# Patient Record
Sex: FEMALE | Race: BLACK OR AFRICAN AMERICAN | NOT HISPANIC OR LATINO | URBAN - METROPOLITAN AREA
[De-identification: names, ages, dates, MRNs, and addresses within clinical notes are randomized per-mention and may not be internally consistent; named-entity substitution may affect disease eponyms.]

---

## 2023-11-11 ENCOUNTER — HOSPITAL ENCOUNTER (EMERGENCY)
Facility: HOSPITAL | Age: 48
Discharge: HOME | End: 2023-11-11
Attending: STUDENT IN AN ORGANIZED HEALTH CARE EDUCATION/TRAINING PROGRAM
Payer: COMMERCIAL

## 2023-11-11 VITALS
HEIGHT: 64 IN | HEART RATE: 80 BPM | WEIGHT: 148 LBS | BODY MASS INDEX: 25.27 KG/M2 | RESPIRATION RATE: 20 BRPM | OXYGEN SATURATION: 99 % | TEMPERATURE: 97.3 F | DIASTOLIC BLOOD PRESSURE: 71 MMHG | SYSTOLIC BLOOD PRESSURE: 123 MMHG

## 2023-11-11 DIAGNOSIS — M54.42 ACUTE LEFT-SIDED LOW BACK PAIN WITH LEFT-SIDED SCIATICA: ICD-10-CM

## 2023-11-11 PROCEDURE — 99285 EMERGENCY DEPT VISIT HI MDM: CPT | Mod: 25 | Performed by: STUDENT IN AN ORGANIZED HEALTH CARE EDUCATION/TRAINING PROGRAM

## 2023-11-11 PROCEDURE — 2500000001 HC RX 250 WO HCPCS SELF ADMINISTERED DRUGS (ALT 637 FOR MEDICARE OP): Performed by: STUDENT IN AN ORGANIZED HEALTH CARE EDUCATION/TRAINING PROGRAM

## 2023-11-11 PROCEDURE — 94760 N-INVAS EAR/PLS OXIMETRY 1: CPT

## 2023-11-11 PROCEDURE — 99283 EMERGENCY DEPT VISIT LOW MDM: CPT | Mod: 25

## 2023-11-11 PROCEDURE — 96372 THER/PROPH/DIAG INJ SC/IM: CPT

## 2023-11-11 PROCEDURE — 2500000004 HC RX 250 GENERAL PHARMACY W/ HCPCS (ALT 636 FOR OP/ED): Performed by: STUDENT IN AN ORGANIZED HEALTH CARE EDUCATION/TRAINING PROGRAM

## 2023-11-11 RX ORDER — METHOCARBAMOL 500 MG/1
500 TABLET, FILM COATED ORAL 4 TIMES DAILY
Qty: 12 TABLET | Refills: 0 | Status: SHIPPED | OUTPATIENT
Start: 2023-11-11 | End: 2024-02-22 | Stop reason: ALTCHOICE

## 2023-11-11 RX ORDER — KETOROLAC TROMETHAMINE 30 MG/ML
15 INJECTION, SOLUTION INTRAMUSCULAR; INTRAVENOUS ONCE
Status: COMPLETED | OUTPATIENT
Start: 2023-11-11 | End: 2023-11-11

## 2023-11-11 RX ORDER — METHOCARBAMOL 500 MG/1
1000 TABLET, FILM COATED ORAL EVERY 6 HOURS SCHEDULED
Status: DISCONTINUED | OUTPATIENT
Start: 2023-11-11 | End: 2023-11-11 | Stop reason: HOSPADM

## 2023-11-11 RX ADMIN — KETOROLAC TROMETHAMINE 15 MG: 30 INJECTION, SOLUTION INTRAMUSCULAR at 10:39

## 2023-11-11 RX ADMIN — METHOCARBAMOL 1000 MG: 500 TABLET ORAL at 10:40

## 2023-11-11 ASSESSMENT — PAIN DESCRIPTION - PAIN TYPE: TYPE: ACUTE PAIN

## 2023-11-11 ASSESSMENT — PAIN - FUNCTIONAL ASSESSMENT: PAIN_FUNCTIONAL_ASSESSMENT: WONG-BAKER FACES

## 2023-11-11 ASSESSMENT — PAIN SCALES - WONG BAKER: WONGBAKER_NUMERICALRESPONSE: NO HURT

## 2023-11-11 ASSESSMENT — COLUMBIA-SUICIDE SEVERITY RATING SCALE - C-SSRS
1. IN THE PAST MONTH, HAVE YOU WISHED YOU WERE DEAD OR WISHED YOU COULD GO TO SLEEP AND NOT WAKE UP?: NO
6. HAVE YOU EVER DONE ANYTHING, STARTED TO DO ANYTHING, OR PREPARED TO DO ANYTHING TO END YOUR LIFE?: NO
2. HAVE YOU ACTUALLY HAD ANY THOUGHTS OF KILLING YOURSELF?: NO

## 2023-11-11 ASSESSMENT — PAIN DESCRIPTION - LOCATION: LOCATION: BACK

## 2023-11-11 ASSESSMENT — PAIN DESCRIPTION - DIRECTION: RADIATING_TOWARDS: FOOT

## 2023-11-11 ASSESSMENT — PAIN DESCRIPTION - FREQUENCY: FREQUENCY: CONSTANT/CONTINUOUS

## 2023-11-11 ASSESSMENT — PAIN DESCRIPTION - ORIENTATION: ORIENTATION: RIGHT

## 2023-11-11 NOTE — ED PROVIDER NOTES
HPI   Chief Complaint   Patient presents with    Back Pain     Seen at local er few days ago for same, discharged. Had numbness to right foot at that time. Seeking new primary doctor. States can't walk but was in shower and coughed and felt a pop in right back now pain is worse       Presents with low back pain.  States she has had low back pain for approximately 6 months but it worsened over the past several days.  States that she initially was seen in the ER at [facility], where she was given 2 injections in the arm and ultimately discharged with steroids, muscle relaxers, Tylenol, and lidocaine patches.  She presents today for acute worsening of pain after she coughed while in the shower.  She describes the pain in her left low back that goes down her left leg.  She notes a numbness/tingling sensation from her knee down to her foot.  She denies fevers, chills, nausea, vomiting, saddle paresthesia, bowel/bladder incontinence, focal weakness, recent falls, recent heavy lifting, and history of IV drug use.  No history of malignancy, night sweats, or unexplained weight loss/weight gain.  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      History provided by:  Patient   used: No                        No data recorded                Patient History   No past medical history on file.  No past surgical history on file.  No family history on file.  Social History     Tobacco Use    Smoking status: Not on file    Smokeless tobacco: Not on file   Substance Use Topics    Alcohol use: Not on file    Drug use: Not on file       Physical Exam   ED Triage Vitals [11/11/23 1006]   Temp Heart Rate Resp BP   36.3 °C (97.3 °F) 55 18 104/56      SpO2 Temp Source Heart Rate Source Patient Position   98 % Temporal -- Sitting      BP Location FiO2 (%)     Right arm --       Physical Exam  Vitals and nursing note reviewed.   HENT:      Head: Atraumatic.      Mouth/Throat:      Mouth: Mucous  membranes are moist.   Eyes:      Conjunctiva/sclera: Conjunctivae normal.   Cardiovascular:      Rate and Rhythm: Normal rate and regular rhythm.   Pulmonary:      Effort: Pulmonary effort is normal.   Abdominal:      Palpations: Abdomen is soft.      Tenderness: There is no abdominal tenderness.   Musculoskeletal:         General: No deformity.      Cervical back: Normal range of motion.      Comments: There is no spinal tenderness to palpation.  There is lower left lumbar paravertebral tenderness to palpation.  The patient does have tenderness to palpation over the left SI joint.  Left lower extremity: No obvious deformities.  No bony tenderness palpation.  Positive KERRI test.  5/5 strength with hip flexion/extension, knee flexion/extension, ankle dorsiflexion/plantarflexion, and great toe dorsiflexion/plantarflexion.  Mild decrease in sensation from the knee to the foot as compared to the right lower extremity.  No clonus.  No hyperreflexia at the patellar or the Achilles.  Downgoing toes with Babinski.  Right lower extremity: No obvious deformities.  No bony tenderness palpation.5/5 strength with hip flexion/extension, knee flexion/extension, ankle dorsiflexion/plantarflexion, and great toe dorsiflexion/plantarflexion.  Sensation is intact to light touch in all dermatomal distributions.  No clonus.  No hyperreflexia at the patellar or the Achilles.  Downgoing toes with Babinski.   Skin:     General: Skin is warm and dry.   Neurological:      Mental Status: She is alert.      Comments: Moving all extremities         ED Course & MDM   ED Course as of 11/11/23 1218   Sat Nov 11, 2023   1154 Able to ambulate with assistance.  Patient states that difficulty ambulating secondary to pain and not weakness. [AB]      ED Course User Index  [AB] Jose Cano MD         Diagnoses as of 11/11/23 1218   Acute left-sided low back pain with left-sided sciatica       Medical Decision Making  No red flag signs/symptoms to  suggest cord compression, cauda equina, or pathologic fracture.  I do not think that she needs emergent MRI or CT imaging at this time.  Given her tenderness and findings on exam, more so suspect SI joint inflammation, IT band dysfunction and/or low back strain.  As for her decreased sensation from the knee down, we considered a peroneal neuropathy, patient does not have foot drop on exam and it does not follow a particular dermatomal distribution; she can follow-up as an outpatient for this.  Patient does state that she has an appoint with orthopedic surgery scheduled on the 16th of this month.  Reviewed how narcotics are not indicated for low back pain are likely to result in side effects as opposed to any clinical benefit.  We did discuss trialing Robaxin and will have her discontinue her current Norflex prescription in favor of Robaxin prescription, if this works.  We will also give IM Toradol.  We also discussed how time would help these injuries recover in addition to seeing orthopedic surgery for possible physical therapy.        Procedure  Procedures     Jose Cano MD  11/11/23 8175

## 2023-11-11 NOTE — DISCHARGE INSTRUCTIONS
Please follow-up with the orthopedic surgeon, as scheduled.  Please return to the emergency department for any of the signs/symptoms that we discussed, including loss of bowel/bladder control, saddle paresthesia, and weakness.  Please stop taking your Norflex and instead start the Robaxin prescription as needed for your pain.

## 2023-11-12 ENCOUNTER — HOSPITAL ENCOUNTER (EMERGENCY)
Facility: HOSPITAL | Age: 48
Discharge: HOME | End: 2023-11-13
Attending: STUDENT IN AN ORGANIZED HEALTH CARE EDUCATION/TRAINING PROGRAM
Payer: COMMERCIAL

## 2023-11-12 DIAGNOSIS — M51.27 LUMBOSACRAL DISC HERNIATION: ICD-10-CM

## 2023-11-12 DIAGNOSIS — M54.41 ACUTE LOW BACK PAIN WITH RIGHT-SIDED SCIATICA, UNSPECIFIED BACK PAIN LATERALITY: Primary | ICD-10-CM

## 2023-11-12 PROCEDURE — 85025 COMPLETE CBC W/AUTO DIFF WBC: CPT | Performed by: STUDENT IN AN ORGANIZED HEALTH CARE EDUCATION/TRAINING PROGRAM

## 2023-11-12 PROCEDURE — 36415 COLL VENOUS BLD VENIPUNCTURE: CPT | Performed by: STUDENT IN AN ORGANIZED HEALTH CARE EDUCATION/TRAINING PROGRAM

## 2023-11-12 PROCEDURE — 99284 EMERGENCY DEPT VISIT MOD MDM: CPT | Mod: 25

## 2023-11-12 PROCEDURE — 96374 THER/PROPH/DIAG INJ IV PUSH: CPT

## 2023-11-12 PROCEDURE — 81025 URINE PREGNANCY TEST: CPT

## 2023-11-12 PROCEDURE — 96372 THER/PROPH/DIAG INJ SC/IM: CPT | Mod: 59

## 2023-11-12 PROCEDURE — 80048 BASIC METABOLIC PNL TOTAL CA: CPT | Performed by: STUDENT IN AN ORGANIZED HEALTH CARE EDUCATION/TRAINING PROGRAM

## 2023-11-12 RX ORDER — ACETAMINOPHEN 325 MG/1
650 TABLET ORAL ONCE
Status: COMPLETED | OUTPATIENT
Start: 2023-11-12 | End: 2023-11-13

## 2023-11-12 RX ORDER — ORPHENADRINE CITRATE 30 MG/ML
60 INJECTION INTRAMUSCULAR; INTRAVENOUS ONCE
Status: COMPLETED | OUTPATIENT
Start: 2023-11-12 | End: 2023-11-13

## 2023-11-12 RX ORDER — LIDOCAINE 560 MG/1
1 PATCH PERCUTANEOUS; TOPICAL; TRANSDERMAL ONCE
Status: DISCONTINUED | OUTPATIENT
Start: 2023-11-12 | End: 2023-11-13 | Stop reason: HOSPADM

## 2023-11-13 ENCOUNTER — APPOINTMENT (OUTPATIENT)
Dept: RADIOLOGY | Facility: HOSPITAL | Age: 48
End: 2023-11-13
Payer: COMMERCIAL

## 2023-11-13 VITALS
TEMPERATURE: 98.2 F | WEIGHT: 148 LBS | HEIGHT: 64 IN | SYSTOLIC BLOOD PRESSURE: 125 MMHG | OXYGEN SATURATION: 98 % | HEART RATE: 74 BPM | BODY MASS INDEX: 25.27 KG/M2 | RESPIRATION RATE: 18 BRPM | DIASTOLIC BLOOD PRESSURE: 77 MMHG

## 2023-11-13 LAB
ANION GAP SERPL CALC-SCNC: 11 MMOL/L (ref 10–20)
APPEARANCE UR: CLEAR
BASOPHILS # BLD AUTO: 0.03 X10*3/UL (ref 0–0.1)
BASOPHILS NFR BLD AUTO: 0.3 %
BILIRUB UR STRIP.AUTO-MCNC: NEGATIVE MG/DL
BUN SERPL-MCNC: 10 MG/DL (ref 6–23)
CALCIUM SERPL-MCNC: 9.9 MG/DL (ref 8.6–10.3)
CHLORIDE SERPL-SCNC: 105 MMOL/L (ref 98–107)
CO2 SERPL-SCNC: 27 MMOL/L (ref 21–32)
COLOR UR: NORMAL
CREAT SERPL-MCNC: 0.77 MG/DL (ref 0.5–1.05)
EOSINOPHIL # BLD AUTO: 0.09 X10*3/UL (ref 0–0.7)
EOSINOPHIL NFR BLD AUTO: 0.8 %
ERYTHROCYTE [DISTWIDTH] IN BLOOD BY AUTOMATED COUNT: 13.4 % (ref 11.5–14.5)
GFR SERPL CREATININE-BSD FRML MDRD: >90 ML/MIN/1.73M*2
GLUCOSE SERPL-MCNC: 102 MG/DL (ref 74–99)
GLUCOSE UR STRIP.AUTO-MCNC: NEGATIVE MG/DL
HCT VFR BLD AUTO: 37.8 % (ref 36–46)
HGB BLD-MCNC: 12.2 G/DL (ref 12–16)
IMM GRANULOCYTES # BLD AUTO: 0.07 X10*3/UL (ref 0–0.7)
IMM GRANULOCYTES NFR BLD AUTO: 0.6 % (ref 0–0.9)
KETONES UR STRIP.AUTO-MCNC: NEGATIVE MG/DL
LEUKOCYTE ESTERASE UR QL STRIP.AUTO: NEGATIVE
LYMPHOCYTES # BLD AUTO: 2.01 X10*3/UL (ref 1.2–4.8)
LYMPHOCYTES NFR BLD AUTO: 18.4 %
MCH RBC QN AUTO: 28.1 PG (ref 26–34)
MCHC RBC AUTO-ENTMCNC: 32.3 G/DL (ref 32–36)
MCV RBC AUTO: 87 FL (ref 80–100)
MONOCYTES # BLD AUTO: 0.65 X10*3/UL (ref 0.1–1)
MONOCYTES NFR BLD AUTO: 6 %
NEUTROPHILS # BLD AUTO: 8.07 X10*3/UL (ref 1.2–7.7)
NEUTROPHILS NFR BLD AUTO: 73.9 %
NITRITE UR QL STRIP.AUTO: NEGATIVE
NRBC BLD-RTO: 0 /100 WBCS (ref 0–0)
PH UR STRIP.AUTO: 7 [PH]
PLATELET # BLD AUTO: 303 X10*3/UL (ref 150–450)
POTASSIUM SERPL-SCNC: 4.3 MMOL/L (ref 3.5–5.3)
PREGNANCY TEST URINE, POC: NEGATIVE
PROT UR STRIP.AUTO-MCNC: NEGATIVE MG/DL
RBC # BLD AUTO: 4.34 X10*6/UL (ref 4–5.2)
RBC # UR STRIP.AUTO: NEGATIVE /UL
SODIUM SERPL-SCNC: 139 MMOL/L (ref 136–145)
SP GR UR STRIP.AUTO: 1.01
UROBILINOGEN UR STRIP.AUTO-MCNC: <2 MG/DL
WBC # BLD AUTO: 10.9 X10*3/UL (ref 4.4–11.3)

## 2023-11-13 PROCEDURE — 72148 MRI LUMBAR SPINE W/O DYE: CPT

## 2023-11-13 PROCEDURE — 2500000004 HC RX 250 GENERAL PHARMACY W/ HCPCS (ALT 636 FOR OP/ED): Performed by: STUDENT IN AN ORGANIZED HEALTH CARE EDUCATION/TRAINING PROGRAM

## 2023-11-13 PROCEDURE — 81003 URINALYSIS AUTO W/O SCOPE: CPT | Performed by: STUDENT IN AN ORGANIZED HEALTH CARE EDUCATION/TRAINING PROGRAM

## 2023-11-13 PROCEDURE — 2500000005 HC RX 250 GENERAL PHARMACY W/O HCPCS: Performed by: STUDENT IN AN ORGANIZED HEALTH CARE EDUCATION/TRAINING PROGRAM

## 2023-11-13 PROCEDURE — 72148 MRI LUMBAR SPINE W/O DYE: CPT | Performed by: RADIOLOGY

## 2023-11-13 RX ORDER — LIDOCAINE 50 MG/G
1 PATCH TOPICAL DAILY
Qty: 7 PATCH | Refills: 0 | Status: SHIPPED | OUTPATIENT
Start: 2023-11-13 | End: 2023-11-20

## 2023-11-13 RX ORDER — KETOROLAC TROMETHAMINE 30 MG/ML
15 INJECTION, SOLUTION INTRAMUSCULAR; INTRAVENOUS ONCE
Status: COMPLETED | OUTPATIENT
Start: 2023-11-13 | End: 2023-11-13

## 2023-11-13 RX ADMIN — LIDOCAINE 1 PATCH: 4 PATCH TOPICAL at 00:04

## 2023-11-13 RX ADMIN — KETOROLAC TROMETHAMINE 15 MG: 30 INJECTION, SOLUTION INTRAMUSCULAR; INTRAVENOUS at 01:36

## 2023-11-13 RX ADMIN — ORPHENADRINE CITRATE 60 MG: 60 INJECTION INTRAMUSCULAR; INTRAVENOUS at 00:05

## 2023-11-13 RX ADMIN — ACETAMINOPHEN 650 MG: 325 TABLET ORAL at 00:05

## 2023-11-13 ASSESSMENT — LIFESTYLE VARIABLES
HAVE PEOPLE ANNOYED YOU BY CRITICIZING YOUR DRINKING: NO
EVER HAD A DRINK FIRST THING IN THE MORNING TO STEADY YOUR NERVES TO GET RID OF A HANGOVER: NO
HAVE YOU EVER FELT YOU SHOULD CUT DOWN ON YOUR DRINKING: NO
EVER FELT BAD OR GUILTY ABOUT YOUR DRINKING: NO
REASON UNABLE TO ASSESS: NO

## 2023-11-13 ASSESSMENT — PAIN - FUNCTIONAL ASSESSMENT: PAIN_FUNCTIONAL_ASSESSMENT: 0-10

## 2023-11-13 ASSESSMENT — PAIN DESCRIPTION - LOCATION: LOCATION: BACK

## 2023-11-13 ASSESSMENT — PAIN DESCRIPTION - ORIENTATION: ORIENTATION: RIGHT

## 2023-11-13 ASSESSMENT — PAIN SCALES - GENERAL: PAINLEVEL_OUTOF10: 4

## 2023-11-13 NOTE — ED PROVIDER NOTES
HPI   Chief Complaint   Patient presents with    Back Pain     Pt has right sided lower back pain that goes to right hip to foot. States that its hard to walk       48-year-old female past with history of diverticulosis presenting to the emergency department for symptoms of lower back pain.  Patient was seen in the ER few days prior and was diagnosed with sciatica and discharged with Robaxin.  She states she is to the point where she is having weakness in her right lower extremity and is having difficulty with ambulation.  Pain originates from the lower back and radiates downwards.  She denies any saddle anesthesia or urinary/stool incontinence or any other muscle aches and pains.  Patient denies any chest pain or shortness of breath.  She has no dysuria hematuria or any other muscle aches and pains to report at this time.                          No data recorded                Patient History   No past medical history on file.  No past surgical history on file.  No family history on file.  Social History     Tobacco Use    Smoking status: Not on file    Smokeless tobacco: Not on file   Substance Use Topics    Alcohol use: Not on file    Drug use: Not on file       Physical Exam   ED Triage Vitals [11/12/23 2324]   Temp Heart Rate Resp BP   36.1 °C (97 °F) 74 18 116/60      SpO2 Temp src Heart Rate Source Patient Position   -- -- -- --      BP Location FiO2 (%)     -- --       Physical Exam  Vitals reviewed.   Constitutional:       Appearance: Normal appearance.   HENT:      Head: Normocephalic and atraumatic.      Nose: Nose normal.      Mouth/Throat:      Mouth: Mucous membranes are moist.   Eyes:      Extraocular Movements: Extraocular movements intact.      Conjunctiva/sclera: Conjunctivae normal.   Cardiovascular:      Rate and Rhythm: Normal rate and regular rhythm.      Pulses: Normal pulses.      Heart sounds: Normal heart sounds.   Pulmonary:      Effort: Pulmonary effort is normal.      Breath sounds:  Normal breath sounds.   Abdominal:      General: Abdomen is flat. Bowel sounds are normal.      Palpations: Abdomen is soft.   Musculoskeletal:         General: Normal range of motion.      Cervical back: Normal. No bony tenderness.      Thoracic back: Normal. No bony tenderness.      Lumbar back: Normal. No bony tenderness.      Comments: Paraspinal tenderness in the lumbar spine.  No midline tenderness.  Range of motion intact in upper and lower extremities however 4/5 strength testing in right lower extremity range of motion.   Skin:     General: Skin is warm and dry.   Neurological:      Mental Status: She is alert and oriented to person, place, and time. Mental status is at baseline.      Cranial Nerves: Cranial nerves 2-12 are intact.      Sensory: Sensation is intact.      Motor: Motor function is intact.   Psychiatric:         Mood and Affect: Mood normal.         ED Course & MDM   ED Course as of 11/13/23 0317   Sun Nov 12, 2023   2335 48-year-old past medical history of diverticulosis presents emergency department for symptoms of lower back pain.  On examination vital signs are stable patient is well-appearing no Signy distress 2+ peripheral pulses.  She has 4/5 strength testing in right hip knee and ankle range of motion 2+ deep tendon reflexes.  2+ peripheral pulses.  Light touch sensation grossly intact in all 4 extremities.  Paraspinal tenderness no midline tenderness of spine.  Differential diagnosis includes spinal cord impingement patient has been ordered an MRI to look for spinal cord impingement at this time.  Patient will also have CBC CMP urine pregnancy test Tylenol and lidocaine patch ordered at this time patient will routinely reevaluate. [ZS]   Mon Nov 13, 2023   0041 Patient was also ordered Norflex.  Urinalysis showed no acute findings CBC CMP unremarkable hCG negative.  I ordered Toradol. [ZS]   0242 MRIs showed disc herniation at L4-L5 with no canal stenosis with compression of the L5  nerve root. [ZS]   0316 Is feeling better.  She is agreeable with discharge plan.  She will be given lidocaine patches.  Return precautions were discussed.  She will be given pain medicine follow-up and patient already has orthopedic spinal surgery follow-up. [ZS]      ED Course User Index  [ZS] Betzy Cruz MD         Diagnoses as of 11/13/23 0317   Acute low back pain with right-sided sciatica, unspecified back pain laterality   Lumbosacral disc herniation       Medical Decision Making      Procedure  Procedures     Betzy Cruz MD  11/13/23 0317

## 2023-11-13 NOTE — DISCHARGE INSTRUCTIONS
If you have any worsening back pain numbness weakness tingling arms or legs inability to control your bladder or bowels or any new/worsening/concerning symptoms return to the emergency department emergently by calling 911.  Read the provided information packet.

## 2023-12-05 ENCOUNTER — APPOINTMENT (OUTPATIENT)
Dept: NEUROSURGERY | Facility: CLINIC | Age: 48
End: 2023-12-05
Payer: COMMERCIAL

## 2023-12-11 ENCOUNTER — DOCUMENTATION (OUTPATIENT)
Dept: NEUROSURGERY | Facility: CLINIC | Age: 48
End: 2023-12-11
Payer: COMMERCIAL

## 2023-12-12 ENCOUNTER — OFFICE VISIT (OUTPATIENT)
Dept: NEUROSURGERY | Facility: CLINIC | Age: 48
End: 2023-12-12
Payer: COMMERCIAL

## 2023-12-12 ENCOUNTER — APPOINTMENT (OUTPATIENT)
Dept: NEUROSURGERY | Facility: CLINIC | Age: 48
End: 2023-12-12
Payer: COMMERCIAL

## 2023-12-12 VITALS
BODY MASS INDEX: 25.61 KG/M2 | HEIGHT: 64 IN | RESPIRATION RATE: 16 BRPM | DIASTOLIC BLOOD PRESSURE: 76 MMHG | SYSTOLIC BLOOD PRESSURE: 110 MMHG | HEART RATE: 105 BPM | TEMPERATURE: 97.5 F | WEIGHT: 150 LBS

## 2023-12-12 DIAGNOSIS — M51.36 BULGE OF LUMBAR DISC WITHOUT MYELOPATHY: ICD-10-CM

## 2023-12-12 DIAGNOSIS — M51.16 LUMBAR DISC HERNIATION WITH RADICULOPATHY: Primary | ICD-10-CM

## 2023-12-12 PROCEDURE — 99204 OFFICE O/P NEW MOD 45 MIN: CPT | Performed by: NEUROLOGICAL SURGERY

## 2023-12-12 PROCEDURE — 99214 OFFICE O/P EST MOD 30 MIN: CPT | Performed by: NEUROLOGICAL SURGERY

## 2023-12-12 RX ORDER — OXYCODONE HYDROCHLORIDE 5 MG/1
5 CAPSULE ORAL EVERY 4 HOURS PRN
COMMUNITY
End: 2023-12-18 | Stop reason: ALTCHOICE

## 2023-12-12 RX ORDER — GABAPENTIN 400 MG/1
400 CAPSULE ORAL 3 TIMES DAILY
COMMUNITY
End: 2023-12-18 | Stop reason: ALTCHOICE

## 2023-12-12 RX ORDER — DULOXETIN HYDROCHLORIDE 30 MG/1
30 CAPSULE, DELAYED RELEASE ORAL DAILY
COMMUNITY
End: 2023-12-18 | Stop reason: ALTCHOICE

## 2023-12-12 ASSESSMENT — PAIN SCALES - GENERAL: PAINLEVEL: 4

## 2023-12-12 NOTE — PROGRESS NOTES
It was a pleasure to see Ms. Bowman at the [facility] at [facility].     She is a really nice 48 y.o. female  who presents to us with complaints primarily axial LOWER BACK pain  that started about  7 months ago, and have been gradually worsening since that time.  The symptoms started after no known injury    The pain is 4 /10. The pain is described as aching and occurs all day.  Symptoms are exacerbated by walking for more than a few minutes. Factors which relieve the pain include narcotic pain medications      Numbness and/or tingling - YES    Weakness : No except some pain limited weakness.    Bladder/Bowel symptoms - NO    The patient has tried medications (eg: gabapentin, NSAIDS and narcotics ) : Yes  PT : No  Pain Management with ESIs/selective nerve blocks  - NO    she is a SMOKER and NON-DIABETIC    History of Depression : YES    PRIOR SPINE SURGERY: NO    Denies use of Aspirin, Coumadin, or Plavix or any other anticoagulants     NARCOTICS for pain : YES    Part of this patient’s history is from personal review of the patient’s previous charts.      No past medical history on file.      Current Outpatient Medications:     methocarbamol (Robaxin) 500 mg tablet, Take 1 tablet (500 mg) by mouth 4 times a day for 3 days. As needed for pain, Disp: 12 tablet, Rfl: 0      Review of Systems :   Constitutional: No fever or chills  Respiratory: No shortness of breath or wheezing  Musculoskeletal: see HPI above   Neurologic: See HPI above        EXAM:   There were no vitals filed for this visit.  NEURO: Neurologically patient is awake alert and oriented X 3    No obvious cranial nerve deficit.  Strength is almost 5/5 throughout all motor groups tested with no asymmetric significant motor deficit.  Deep tendon reflexes are symmetric throughout.   Gait : Antalgic.  SLR positive at 30 degrees.  Sensory examination is intact to touch and painful stimuli.      IMAGING:   MRI of the lumbar spine done  on 11/13/2023 was reviewed personally and shows evidence of a right L4-5 disc herniation with lateral recess stenosis.    ASSESSMENT AND PLAN:  Ms. Bowman has not tried any conservative treatment. Her neurological examination is completely benign with absence of any focal neurological deficits.  Given the absence of any focal neurological deficit or any bowel bladder symptoms there is no urgent indication to consider any surgical intervention. I have given her a referral for  PAIN management with Dr. Mccallum.  She already has a referral for physical therapy and I encouraged her to do that.  I will be happy to see her back if she continues to be symptomatic despite all conservative treatment to discuss surgical options in details as I do believe the patient has a pathology that is amenable to surgery with good overall long term outcome.  I discussed with her that if she develops any symptoms of focal motor weakness or bowel or symptoms she should go to an ED in which case she may require emergency surgery.          Femi Tucker MD, [facility]   of Neurological Surgery  [employer] [employer] [facility] [employer]  Attending Surgeon  Director - Minimally Invasive Spine Surgery  [place], [place]      Some of this note was completed using Dragon voice recognition technology and sometimes the software misinterprets words. This may include unintended errors with respect to translation of words, typographical errors or grammar errors which may not have been identified prior to finalization of the chart note. Please take this into account when reading this note

## 2023-12-14 PROBLEM — F10.10 ALCOHOL ABUSE: Status: ACTIVE | Noted: 2023-12-14

## 2023-12-14 PROBLEM — F17.200 NICOTINE USE DISORDER: Status: ACTIVE | Noted: 2023-11-23

## 2023-12-14 PROBLEM — B97.7 HUMAN PAPILLOMA VIRUS INFECTION: Status: ACTIVE | Noted: 2023-12-14

## 2023-12-14 PROBLEM — M53.9 SCIATICA ASSOCIATED WITH DISORDER OF MULTIPLE SITES OF SPINE: Status: ACTIVE | Noted: 2023-11-19

## 2023-12-14 PROBLEM — F41.9 ANXIETY: Status: ACTIVE | Noted: 2019-02-28

## 2023-12-14 PROBLEM — F43.10 PTSD (POST-TRAUMATIC STRESS DISORDER): Status: ACTIVE | Noted: 2019-02-28

## 2023-12-14 PROBLEM — M54.30 SCIATICA ASSOCIATED WITH DISORDER OF MULTIPLE SITES OF SPINE: Status: ACTIVE | Noted: 2023-11-19

## 2023-12-14 RX ORDER — GABAPENTIN 400 MG/1
400 CAPSULE ORAL 3 TIMES DAILY
COMMUNITY
Start: 2023-11-23 | End: 2024-02-22

## 2023-12-14 RX ORDER — KETOROLAC TROMETHAMINE 10 MG/1
TABLET, FILM COATED ORAL EVERY 6 HOURS
COMMUNITY
Start: 2023-12-05

## 2023-12-14 RX ORDER — DULOXETIN HYDROCHLORIDE 30 MG/1
1 CAPSULE, DELAYED RELEASE ORAL EVERY 24 HOURS
COMMUNITY
Start: 2023-12-05

## 2023-12-14 RX ORDER — NALOXONE HYDROCHLORIDE 4 MG/.1ML
SPRAY NASAL
COMMUNITY
Start: 2023-12-08 | End: 2024-02-22 | Stop reason: ALTCHOICE

## 2023-12-14 RX ORDER — OXYCODONE HYDROCHLORIDE 5 MG/1
TABLET ORAL EVERY 6 HOURS
COMMUNITY
Start: 2023-12-08 | End: 2024-02-22 | Stop reason: ALTCHOICE

## 2023-12-14 RX ORDER — GABAPENTIN 400 MG/1
1 CAPSULE ORAL EVERY 8 HOURS
COMMUNITY
End: 2023-12-18 | Stop reason: ALTCHOICE

## 2023-12-14 RX ORDER — LORATADINE 10 MG/1
10 TABLET ORAL
COMMUNITY
Start: 2016-12-08 | End: 2023-12-18 | Stop reason: ALTCHOICE

## 2023-12-14 RX ORDER — SERTRALINE HYDROCHLORIDE 100 MG/1
200 TABLET, FILM COATED ORAL
COMMUNITY
End: 2023-12-18 | Stop reason: ALTCHOICE

## 2023-12-14 RX ORDER — ACETAMINOPHEN 500 MG
1000 TABLET ORAL EVERY 8 HOURS PRN
COMMUNITY
Start: 2023-11-23

## 2023-12-18 ENCOUNTER — OFFICE VISIT (OUTPATIENT)
Dept: PAIN MEDICINE | Facility: CLINIC | Age: 48
End: 2023-12-18
Payer: COMMERCIAL

## 2023-12-18 VITALS
RESPIRATION RATE: 15 BRPM | WEIGHT: 150 LBS | BODY MASS INDEX: 24.99 KG/M2 | HEART RATE: 110 BPM | DIASTOLIC BLOOD PRESSURE: 69 MMHG | TEMPERATURE: 96.3 F | HEIGHT: 65 IN | SYSTOLIC BLOOD PRESSURE: 113 MMHG

## 2023-12-18 DIAGNOSIS — G89.29 CHRONIC RIGHT-SIDED LOW BACK PAIN WITH RIGHT-SIDED SCIATICA: ICD-10-CM

## 2023-12-18 DIAGNOSIS — M51.36 BULGE OF LUMBAR DISC WITHOUT MYELOPATHY: ICD-10-CM

## 2023-12-18 DIAGNOSIS — M54.41 CHRONIC RIGHT-SIDED LOW BACK PAIN WITH RIGHT-SIDED SCIATICA: ICD-10-CM

## 2023-12-18 DIAGNOSIS — M47.816 LUMBAR SPONDYLOSIS: ICD-10-CM

## 2023-12-18 DIAGNOSIS — M54.16 LUMBAR NEURITIS: Primary | ICD-10-CM

## 2023-12-18 DIAGNOSIS — M51.26 LUMBAR DISC HERNIATION: ICD-10-CM

## 2023-12-18 PROCEDURE — 99215 OFFICE O/P EST HI 40 MIN: CPT | Performed by: ANESTHESIOLOGY

## 2023-12-18 RX ORDER — GABAPENTIN 600 MG/1
600 TABLET ORAL 3 TIMES DAILY
Qty: 90 TABLET | Refills: 11 | Status: SHIPPED | OUTPATIENT
Start: 2023-12-18 | End: 2024-02-22

## 2023-12-18 ASSESSMENT — COLUMBIA-SUICIDE SEVERITY RATING SCALE - C-SSRS
2. HAVE YOU ACTUALLY HAD ANY THOUGHTS OF KILLING YOURSELF?: NO
6. HAVE YOU EVER DONE ANYTHING, STARTED TO DO ANYTHING, OR PREPARED TO DO ANYTHING TO END YOUR LIFE?: NO
1. IN THE PAST MONTH, HAVE YOU WISHED YOU WERE DEAD OR WISHED YOU COULD GO TO SLEEP AND NOT WAKE UP?: NO

## 2023-12-18 ASSESSMENT — LIFESTYLE VARIABLES
AUDIT-C TOTAL SCORE: 0
HOW OFTEN DO YOU HAVE A DRINK CONTAINING ALCOHOL: NEVER
HOW OFTEN DO YOU HAVE SIX OR MORE DRINKS ON ONE OCCASION: NEVER
HOW MANY STANDARD DRINKS CONTAINING ALCOHOL DO YOU HAVE ON A TYPICAL DAY: PATIENT DOES NOT DRINK
SKIP TO QUESTIONS 9-10: 1

## 2023-12-18 ASSESSMENT — PATIENT HEALTH QUESTIONNAIRE - PHQ9
SUM OF ALL RESPONSES TO PHQ9 QUESTIONS 1 AND 2: 0
2. FEELING DOWN, DEPRESSED OR HOPELESS: NOT AT ALL
1. LITTLE INTEREST OR PLEASURE IN DOING THINGS: NOT AT ALL

## 2023-12-18 ASSESSMENT — PAIN SCALES - GENERAL: PAINLEVEL: 3

## 2023-12-18 NOTE — PROGRESS NOTES
Right low back pain to the hip down the leg and into the foot.  History of hip pain.  Denies back and neck surgery.

## 2023-12-18 NOTE — PROGRESS NOTES
History Of Present Illness  Ilsa Bowman is a 48 y.o. female presenting with   Chief Complaint   Patient presents with    Back Pain       Patient presents with complaints of chronic low back pain to the RLE thru her tailbone and down her RLE with numbness in her first 2 great toes since November of 2023. She was hospitalized for several days at [facility] for severe pain and was unable to walk.  She was treated for pain and discharged .       The pain is constant, worse with standing and better with sitting. The pain is a locking and stabbing and shooting to the RLE. Denies LE saddle anesthesia, bowel or bladder incontinence. To manage this pain the patient has attempted Oxycodone via hospital discharge.     PAIN SCORE:  3-10 /10.    PCP: Dr. Gerardo Morrow MD ([facility])        Past Medical History  She has no past medical history on file.    Surgical History  She has a past surgical history that includes XR ankle (Left).     Social History  She reports that she has been smoking cigarettes. She has never used smokeless tobacco. She reports that she does not drink alcohol and does not use drugs.    Works as a  and does a lot of lifting     Family History  No family history on file.     Allergies  Patient has no known allergies.    Review of Systems    All other systems reviewed and negative for any deficits. Pertinent positives and negatives were considered in the medical decision making process.        Physical Exam  /69   Pulse 110   Temp 35.7 °C (96.3 °F)   Resp 15   Wt 68 kg (150 lb)     General: Pt appears stated age    Eyes: Conjunctiva non-icteric and lids without obvious rash or drooping. Pupils are symmetric    ENT: External ears and nose appear to be without deformity or rash. No lesions or masses noted. Hearing is grossly intact    Neck: No JVD noted, tracheal position midline. No goiter noted on assessment of thyroid    Respiratory: No gasping or shortness of breath  noted, no use of accessory muscles noted    Cardiovascular: Extremities show no edema or varicosities    Musculoskeletal: Gait is antalgic, unable to stand without assistance     Digits/nails show no clubbing or cyanosis    Exam of muscles/joints/bones shows no gross atrophy and no abnormal/involuntary movements in the head/neckNo asymmetry or masses noted in the head/neck    Stability: no subluxation noted on movement of bilateral upper extremities or head/neck    Strength: 3/5 in RLE and 5/5 LLE     Range of Motion: DEC DORSIFLEXION IN RLE     Sensation: dec to sharp touch in rle into the shin     Cranial nerves 2-12 are grossly intact    Psychiatric: Pt is alert and oriented to time, place and person.         Assessment/Plan   1. Lumbar neuritis  gabapentin (Neurontin) 600 mg tablet    Transforaminal    FL pain management TC      2. Bulge of lumbar disc without myelopathy  Referral to Pain Medicine      3. Lumbar disc herniation        4. Chronic right-sided low back pain with right-sided sciatica        5. Lumbar spondylosis             1. I have provided the patient with a list of physical therapy exercises to learn and perform to strengthen core, maintain stabilization, and reduce pain. We reviewed the exercises in detail and I encouraged them to perform them on a regular basis.    Pt is looking into getting home therapy via her PCP.     2. I would recommend the pt start on Gabapentin 600mg TID to help with nerve related pain. We discussed the risks, benefits, and side effects to this medication including the mechanism of action and the pt understands and agrees.    She was taking Gabapentin 400mg TID with no side effects there is mild to moderate central canal stenosis and severe right lateral recess stenosis.     3. I extensively reviewed the patients MRI findings in detail, including review of the actual images and provided a detailed explanation of the findings using a spine model.     There is noted a  [id number] disc herniation at the L4/5 level severe stenosis. There ar smaller disc herniations at the L3/4 and L5/S1 level.     4.  I advised the patient to quit tobacco as it worsens chronic pain, disc health, and can increase the risk of complications and poor healing with any procedure. Tobacco also causes a whole host of other deadly diseases. I informed the patient that the single most important thing they can do to benefit their health would be to quit tobacco.    5.  The patient is a candidate for a RIGHT LTR L5 AND S1 LTR to treat low back and radicular pain. I spent time with the patient discussing all of the risks, benefits, and alternatives to this measure. Including but not limited to spinal infection, epidural hematoma/abscess, paralysis, nerve injury, steroid effects, and spinal headache. The patient understands and agrees to proceed as needed. Pt understands that her injection may make her pain significantly worse given the degree of her stenosis.         Kofi Mccallum MD    I spent time with the patient reviewing their imaging and discussing the risks benefits and alternatives to the above plan. A total of 45 minutes was spent reviewing the data and greater than 50% of that time was with the patient during the face to face encounter discussing treatment options both surgical, non-surgical, and minimally invasive techniques.

## 2023-12-18 NOTE — H&P (VIEW-ONLY)
History Of Present Illness  Ilsa Bowman is a 48 y.o. female presenting with   Chief Complaint   Patient presents with    Back Pain       Patient presents with complaints of chronic low back pain to the RLE thru her tailbone and down her RLE with numbness in her first 2 great toes since November of 2023. She was hospitalized for several days at [facility] for severe pain and was unable to walk.  She was treated for pain and discharged .       The pain is constant, worse with standing and better with sitting. The pain is a locking and stabbing and shooting to the RLE. Denies LE saddle anesthesia, bowel or bladder incontinence. To manage this pain the patient has attempted Oxycodone via hospital discharge.     PAIN SCORE:  3-10 /10.    PCP: Dr. Gerardo Morrow MD ([facility])        Past Medical History  She has no past medical history on file.    Surgical History  She has a past surgical history that includes XR ankle (Left).     Social History  She reports that she has been smoking cigarettes. She has never used smokeless tobacco. She reports that she does not drink alcohol and does not use drugs.    Works as a  and does a lot of lifting     Family History  No family history on file.     Allergies  Patient has no known allergies.    Review of Systems    All other systems reviewed and negative for any deficits. Pertinent positives and negatives were considered in the medical decision making process.        Physical Exam  /69   Pulse 110   Temp 35.7 °C (96.3 °F)   Resp 15   Wt 68 kg (150 lb)     General: Pt appears stated age    Eyes: Conjunctiva non-icteric and lids without obvious rash or drooping. Pupils are symmetric    ENT: External ears and nose appear to be without deformity or rash. No lesions or masses noted. Hearing is grossly intact    Neck: No JVD noted, tracheal position midline. No goiter noted on assessment of thyroid    Respiratory: No gasping or shortness of breath  noted, no use of accessory muscles noted    Cardiovascular: Extremities show no edema or varicosities    Musculoskeletal: Gait is antalgic, unable to stand without assistance     Digits/nails show no clubbing or cyanosis    Exam of muscles/joints/bones shows no gross atrophy and no abnormal/involuntary movements in the head/neckNo asymmetry or masses noted in the head/neck    Stability: no subluxation noted on movement of bilateral upper extremities or head/neck    Strength: 3/5 in RLE and 5/5 LLE     Range of Motion: DEC DORSIFLEXION IN RLE     Sensation: dec to sharp touch in rle into the shin     Cranial nerves 2-12 are grossly intact    Psychiatric: Pt is alert and oriented to time, place and person.         Assessment/Plan   1. Lumbar neuritis  gabapentin (Neurontin) 600 mg tablet    Transforaminal    [place] pain management TC      2. Bulge of lumbar disc without myelopathy  Referral to Pain Medicine      3. Lumbar disc herniation        4. Chronic right-sided low back pain with right-sided sciatica        5. Lumbar spondylosis             1. I have provided the patient with a list of physical therapy exercises to learn and perform to strengthen core, maintain stabilization, and reduce pain. We reviewed the exercises in detail and I encouraged them to perform them on a regular basis.    Pt is looking into getting home therapy via her PCP.     2. I would recommend the pt start on Gabapentin 600mg TID to help with nerve related pain. We discussed the risks, benefits, and side effects to this medication including the mechanism of action and the pt understands and agrees.    She was taking Gabapentin 400mg TID with no side effects there is mild to moderate central canal stenosis and severe right lateral recess stenosis.     3. I extensively reviewed the patients MRI findings in detail, including review of the actual images and provided a detailed explanation of the findings using a spine model.     There is noted a  [id number] disc herniation at the L4/5 level severe stenosis. There ar smaller disc herniations at the L3/4 and L5/S1 level.     4.  I advised the patient to quit tobacco as it worsens chronic pain, disc health, and can increase the risk of complications and poor healing with any procedure. Tobacco also causes a whole host of other deadly diseases. I informed the patient that the single most important thing they can do to benefit their health would be to quit tobacco.    5.  The patient is a candidate for a RIGHT LTR L5 AND S1 LTR to treat low back and radicular pain. I spent time with the patient discussing all of the risks, benefits, and alternatives to this measure. Including but not limited to spinal infection, epidural hematoma/abscess, paralysis, nerve injury, steroid effects, and spinal headache. The patient understands and agrees to proceed as needed. Pt understands that her injection may make her pain significantly worse given the degree of her stenosis.         Kofi Mccallum MD    I spent time with the patient reviewing their imaging and discussing the risks benefits and alternatives to the above plan. A total of 45 minutes was spent reviewing the data and greater than 50% of that time was with the patient during the face to face encounter discussing treatment options both surgical, non-surgical, and minimally invasive techniques.

## 2023-12-22 ENCOUNTER — APPOINTMENT (OUTPATIENT)
Dept: PAIN MEDICINE | Facility: CLINIC | Age: 48
End: 2023-12-22
Payer: COMMERCIAL

## 2023-12-22 ENCOUNTER — HOSPITAL ENCOUNTER (OUTPATIENT)
Dept: PAIN MEDICINE | Facility: CLINIC | Age: 48
Discharge: HOME | End: 2023-12-22
Payer: COMMERCIAL

## 2023-12-22 ENCOUNTER — ANCILLARY PROCEDURE (OUTPATIENT)
Dept: RADIOLOGY | Facility: CLINIC | Age: 48
End: 2023-12-22
Payer: COMMERCIAL

## 2023-12-22 ENCOUNTER — APPOINTMENT (OUTPATIENT)
Dept: RADIOLOGY | Facility: CLINIC | Age: 48
End: 2023-12-22
Payer: COMMERCIAL

## 2023-12-22 VITALS
SYSTOLIC BLOOD PRESSURE: 122 MMHG | OXYGEN SATURATION: 98 % | TEMPERATURE: 97.5 F | RESPIRATION RATE: 15 BRPM | HEART RATE: 110 BPM | DIASTOLIC BLOOD PRESSURE: 74 MMHG

## 2023-12-22 DIAGNOSIS — M54.16 LUMBAR NEURITIS: ICD-10-CM

## 2023-12-22 PROCEDURE — 2500000004 HC RX 250 GENERAL PHARMACY W/ HCPCS (ALT 636 FOR OP/ED)

## 2023-12-22 PROCEDURE — 2500000005 HC RX 250 GENERAL PHARMACY W/O HCPCS

## 2023-12-22 PROCEDURE — 77003 FLUOROGUIDE FOR SPINE INJECT: CPT

## 2023-12-22 PROCEDURE — A4216 STERILE WATER/SALINE, 10 ML: HCPCS

## 2023-12-22 PROCEDURE — 64483 NJX AA&/STRD TFRM EPI L/S 1: CPT | Mod: RT

## 2023-12-22 RX ORDER — LIDOCAINE HYDROCHLORIDE 5 MG/ML
INJECTION, SOLUTION INFILTRATION; INTRAVENOUS
Status: COMPLETED
Start: 2023-12-22 | End: 2023-12-22

## 2023-12-22 RX ORDER — SODIUM CHLORIDE 9 MG/ML
INJECTION, SOLUTION INTRAMUSCULAR; INTRAVENOUS; SUBCUTANEOUS
Status: COMPLETED
Start: 2023-12-22 | End: 2023-12-22

## 2023-12-22 RX ORDER — TRIAMCINOLONE ACETONIDE 40 MG/ML
INJECTION, SUSPENSION INTRA-ARTICULAR; INTRAMUSCULAR
Status: COMPLETED
Start: 2023-12-22 | End: 2023-12-22

## 2023-12-22 RX ADMIN — SODIUM CHLORIDE 10 ML: 9 INJECTION, SOLUTION INTRAMUSCULAR; INTRAVENOUS; SUBCUTANEOUS at 13:12

## 2023-12-22 RX ADMIN — LIDOCAINE HYDROCHLORIDE 250 MG: 5 INJECTION, SOLUTION INFILTRATION at 13:11

## 2023-12-22 RX ADMIN — TRIAMCINOLONE ACETONIDE 40 MG: 40 INJECTION, SUSPENSION INTRA-ARTICULAR; INTRAMUSCULAR at 13:12

## 2023-12-22 ASSESSMENT — PAIN - FUNCTIONAL ASSESSMENT: PAIN_FUNCTIONAL_ASSESSMENT: 0-10

## 2023-12-22 ASSESSMENT — PAIN SCALES - GENERAL
PAINLEVEL_OUTOF10: 3
PAINLEVEL_OUTOF10: 3

## 2023-12-22 ASSESSMENT — PAIN DESCRIPTION - DESCRIPTORS: DESCRIPTORS: ACHING

## 2023-12-22 NOTE — INTERVAL H&P NOTE
H&P reviewed. The patient was examined and there are no changes to the H&P.    Patient understands that their pain may improve, stay the same, or WORSEN with the intended procedure and agrees to proceed.

## 2023-12-22 NOTE — OP NOTE
12/22/2023    Pre procedure Diagnosis: Lumbar neuritis  Post procedure Diagnosis: Lumbar neuritis    Procedure:     1. RIGHT L4 and L5 Transforaminal epidural steroid injection   2. Fluoroscopic guidance     Complications: None    Assistants: None     EBL: None    The patient was identified in the preoperative area. After risks and benefits were explained, informed consent was obtained. The patient was brought back to the operating room and placed in the prone position. Standard ASA monitors were applied and monitored throughout the procedure. The vital signs were stable throughout. The patient's lumbosacral spine was prepped and draped in usual sterile fashion. Using fluoroscopic guidance the skin overlying the trajectory to the RIGHT L4 AND L5 transforaminal space was anesthetized with a total of 10.0 ml of 0.5% Lidocaine. Thereafter, two 3 in long 22G spinal needle was advanced through the anesthitized skin. Then, the needle was advanced into the RIGHT L4 AND L5 epidural space under multiplanar fluoroscopic guidance. Next a total of 2 ml of OMNIPAQUE 240 radiocontrast dye showed appropriate epidural spread and no vascular uptake. After negative aspiration for heme, or CSF a total of 4 mL of 0.5% Lidocaine and 40 mg of Kenalog was injected in equally divided doses at both sites. The needles were removed and a sterile dressings were applied. The patient tolerated the procedure well and was transported to PACU in good condition.    PLAN: The pt will follow up in the office in one to two months to report their results with the procedure. Discharge instructions were reviewed in recovery and provided to the patient in writing. They were advised to call should they have any questions or concerns.

## 2024-01-25 ENCOUNTER — APPOINTMENT (OUTPATIENT)
Dept: PAIN MEDICINE | Facility: CLINIC | Age: 49
End: 2024-01-25
Payer: COMMERCIAL

## 2024-02-22 ENCOUNTER — OFFICE VISIT (OUTPATIENT)
Dept: PAIN MEDICINE | Facility: CLINIC | Age: 49
End: 2024-02-22
Payer: COMMERCIAL

## 2024-02-22 VITALS
RESPIRATION RATE: 15 BRPM | BODY MASS INDEX: 25.35 KG/M2 | SYSTOLIC BLOOD PRESSURE: 118 MMHG | HEART RATE: 87 BPM | DIASTOLIC BLOOD PRESSURE: 70 MMHG | WEIGHT: 150 LBS | TEMPERATURE: 97.3 F

## 2024-02-22 DIAGNOSIS — M54.50 LOW BACK PAIN, UNSPECIFIED BACK PAIN LATERALITY, UNSPECIFIED CHRONICITY, UNSPECIFIED WHETHER SCIATICA PRESENT: ICD-10-CM

## 2024-02-22 DIAGNOSIS — M48.061 LUMBAR FORAMINAL STENOSIS: ICD-10-CM

## 2024-02-22 DIAGNOSIS — M51.26 LUMBAR DISC HERNIATION: Primary | ICD-10-CM

## 2024-02-22 DIAGNOSIS — M54.16 LUMBAR NEURITIS: ICD-10-CM

## 2024-02-22 PROCEDURE — 99214 OFFICE O/P EST MOD 30 MIN: CPT | Performed by: ANESTHESIOLOGY

## 2024-02-22 RX ORDER — GABAPENTIN 800 MG/1
800 TABLET ORAL 3 TIMES DAILY
Qty: 90 TABLET | Refills: 11 | Status: SHIPPED | OUTPATIENT
Start: 2024-02-22 | End: 2024-04-30 | Stop reason: SDUPTHER

## 2024-02-22 ASSESSMENT — ENCOUNTER SYMPTOMS
LOSS OF SENSATION IN FEET: 0
DEPRESSION: 0
OCCASIONAL FEELINGS OF UNSTEADINESS: 0

## 2024-02-22 ASSESSMENT — PAIN SCALES - GENERAL: PAINLEVEL: 2

## 2024-02-22 NOTE — PROGRESS NOTES
History Of Present Illness  Ilsa Bowman is a 48 y.o. female presenting with   Chief Complaint   Patient presents with    Back Pain    Follow-up       Patient presents with IMPROVED chronic low back pain to the RLE thru her tailbone and down her RLE with numbness in her first 2 great toes since November of 2023. She was hospitalized for several days at [facility] for severe pain and was unable to walk.  She was treated for pain and discharged .       The pain is constant, worse with standing and better with sitting. The pain is a locking and stabbing and shooting to the RLE. Denies LE saddle anesthesia, bowel or bladder incontinence. To manage this pain the patient has attempted Oxycodone via hospital discharge.     PAIN SCORE:  2-7 /10 at her last visit her pain was a 3-10    PCP: Dr. Gerardo Morrow MD ([facility])        Past Medical History  She has no past medical history on file.    Surgical History  She has a past surgical history that includes XR ankle (Left).     Social History  She reports that she quit smoking about 2 months ago. Her smoking use included cigarettes. She uses smokeless tobacco. She reports that she does not drink alcohol and does not use drugs.    Works as a  and does a lot of lifting     Family History  No family history on file.     Allergies  Patient has no known allergies.    Review of Systems    All other systems reviewed and negative for any deficits. Pertinent positives and negatives were considered in the medical decision making process.        Physical Exam  /70   Pulse 87   Temp 36.3 °C (97.3 °F)   Resp 15   Wt 68 kg (150 lb)     General: Pt appears stated age    Eyes: Conjunctiva non-icteric and lids without obvious rash or drooping. Pupils are symmetric    ENT: External ears and nose appear to be without deformity or rash. No lesions or masses noted. Hearing is grossly intact    Neck: No JVD noted, tracheal position midline. No goiter noted on  assessment of thyroid    Respiratory: No gasping or shortness of breath noted, no use of accessory muscles noted    Cardiovascular: Extremities show no edema or varicosities    Musculoskeletal: Gait is antalgic, unable to stand without assistance     Digits/nails show no clubbing or cyanosis    Exam of muscles/joints/bones shows no gross atrophy and no abnormal/involuntary movements in the head/neckNo asymmetry or masses noted in the head/neck    Stability: no subluxation noted on movement of bilateral upper extremities or head/neck    Strength: 4+/5 in RLE and 5/5 LLE     Range of Motion: DEC DORSIFLEXION IN RLE     Sensation: dec to sharp touch in rle into the shin     Cranial nerves 2-12 are grossly intact    Psychiatric: Pt is alert and oriented to time, place and person.         Assessment/Plan   1. Lumbar disc herniation  gabapentin (Neurontin) 800 mg tablet      2. Lumbar foraminal stenosis  gabapentin (Neurontin) 800 mg tablet      3. Lumbar neuritis  gabapentin (Neurontin) 800 mg tablet      4. Low back pain, unspecified back pain laterality, unspecified chronicity, unspecified whether sciatica present  gabapentin (Neurontin) 800 mg tablet           1. I have provided the patient with a list of physical therapy exercises to learn and perform to strengthen core, maintain stabilization, and reduce pain. We reviewed the exercises in detail and I encouraged them to perform them on a regular basis.    Pt is looking into getting home therapy via her PCP.     2. I would recommend the pt start on Gabapentin 800mg TID to help with nerve related pain. We discussed the risks, benefits, and side effects to this medication including the mechanism of action and the pt understands and agrees.    She was taking Gabapentin 600mg TID with no side effects there is mild to moderate central canal stenosis and severe right lateral recess stenosis.     3. I extensively reviewed the patients MRI findings in detail, including  review of the actual images and provided a detailed explanation of the findings using a spine model.     There is noted a [id number] disc herniation at the L4/5 level severe stenosis. There ar smaller disc herniations at the L3/4 and L5/S1 level.     4.  I previously advised the patient to quit tobacco as it worsens chronic pain. She reports she quit tobacco.     5.  The patient is a candidate for a RIGHT LTR L5 AND S1 LTR to treat low back and radicular pain. I spent time with the patient discussing all of the risks, benefits, and alternatives to this measure. Including but not limited to spinal infection, epidural hematoma/abscess, paralysis, nerve injury, steroid effects, and spinal headache. The patient understands and agrees to proceed as needed. Pt understands that her injection may make her pain significantly worse given the degree of her stenosis.     Her last injection was on 12- and she reported 80% relief of her pain that is ONGOING. She has been able to stand and walk with less pain.     Kofi Mccallum MD    I spent time with the patient reviewing their imaging and discussing the risks benefits and alternatives to the above plan. A total of 30 minutes was spent reviewing the data and greater than 50% of that time was with the patient during the face to face encounter discussing treatment options both surgical, non-surgical, and minimally invasive techniques.

## 2024-02-22 NOTE — PROGRESS NOTES
Low back pain to the hips and down the right leg with numbness in the foot and two toes.  Denies back and neck surgery.

## 2024-03-12 ENCOUNTER — TELEPHONE (OUTPATIENT)
Dept: PAIN MEDICINE | Facility: CLINIC | Age: 49
End: 2024-03-12
Payer: COMMERCIAL

## 2024-03-12 DIAGNOSIS — M54.16 LUMBAR NEURITIS: Primary | ICD-10-CM

## 2024-03-13 ENCOUNTER — TELEPHONE (OUTPATIENT)
Dept: PAIN MEDICINE | Facility: CLINIC | Age: 49
End: 2024-03-13
Payer: COMMERCIAL

## 2024-03-13 DIAGNOSIS — M54.16 LUMBAR NEURITIS: Primary | ICD-10-CM

## 2024-03-13 NOTE — TELEPHONE ENCOUNTER
Pt left VM asking if she can schedule an inj.   She was in to see you Feb 22nd for an office visit

## 2024-04-01 ENCOUNTER — APPOINTMENT (OUTPATIENT)
Dept: PAIN MEDICINE | Facility: CLINIC | Age: 49
End: 2024-04-01
Payer: COMMERCIAL

## 2024-04-09 ENCOUNTER — HOSPITAL ENCOUNTER (OUTPATIENT)
Dept: PAIN MEDICINE | Facility: CLINIC | Age: 49
Discharge: HOME | End: 2024-04-09
Payer: COMMERCIAL

## 2024-04-09 ENCOUNTER — HOSPITAL ENCOUNTER (OUTPATIENT)
Dept: RADIOLOGY | Facility: CLINIC | Age: 49
Discharge: HOME | End: 2024-04-09
Payer: COMMERCIAL

## 2024-04-09 VITALS
TEMPERATURE: 97.3 F | DIASTOLIC BLOOD PRESSURE: 64 MMHG | HEART RATE: 96 BPM | OXYGEN SATURATION: 96 % | SYSTOLIC BLOOD PRESSURE: 105 MMHG | RESPIRATION RATE: 15 BRPM

## 2024-04-09 DIAGNOSIS — M54.16 LUMBAR NEURITIS: ICD-10-CM

## 2024-04-09 LAB — PREGNANCY TEST URINE, POC: NEGATIVE

## 2024-04-09 PROCEDURE — A4216 STERILE WATER/SALINE, 10 ML: HCPCS

## 2024-04-09 PROCEDURE — 2500000004 HC RX 250 GENERAL PHARMACY W/ HCPCS (ALT 636 FOR OP/ED)

## 2024-04-09 PROCEDURE — 64483 NJX AA&/STRD TFRM EPI L/S 1: CPT

## 2024-04-09 PROCEDURE — 81025 URINE PREGNANCY TEST: CPT | Performed by: ANESTHESIOLOGY

## 2024-04-09 PROCEDURE — 2500000005 HC RX 250 GENERAL PHARMACY W/O HCPCS

## 2024-04-09 RX ORDER — SODIUM CHLORIDE 9 MG/ML
INJECTION, SOLUTION INTRAMUSCULAR; INTRAVENOUS; SUBCUTANEOUS
Status: COMPLETED
Start: 2024-04-09 | End: 2024-04-09

## 2024-04-09 RX ORDER — LIDOCAINE HYDROCHLORIDE 5 MG/ML
INJECTION, SOLUTION INFILTRATION; INTRAVENOUS
Status: COMPLETED
Start: 2024-04-09 | End: 2024-04-09

## 2024-04-09 RX ORDER — TRIAMCINOLONE ACETONIDE 40 MG/ML
INJECTION, SUSPENSION INTRA-ARTICULAR; INTRAMUSCULAR
Status: COMPLETED
Start: 2024-04-09 | End: 2024-04-09

## 2024-04-09 RX ADMIN — SODIUM CHLORIDE 10 ML: 9 INJECTION, SOLUTION INTRAMUSCULAR; INTRAVENOUS; SUBCUTANEOUS at 11:42

## 2024-04-09 RX ADMIN — TRIAMCINOLONE ACETONIDE 40 MG: 40 INJECTION, SUSPENSION INTRA-ARTICULAR; INTRAMUSCULAR at 11:42

## 2024-04-09 RX ADMIN — LIDOCAINE HYDROCHLORIDE 250 MG: 5 INJECTION, SOLUTION INFILTRATION at 11:42

## 2024-04-09 SDOH — ECONOMIC STABILITY: FOOD INSECURITY: WITHIN THE PAST 12 MONTHS, THE FOOD YOU BOUGHT JUST DIDN'T LAST AND YOU DIDN'T HAVE MONEY TO GET MORE.: NEVER TRUE

## 2024-04-09 SDOH — ECONOMIC STABILITY: FOOD INSECURITY: WITHIN THE PAST 12 MONTHS, YOU WORRIED THAT YOUR FOOD WOULD RUN OUT BEFORE YOU GOT MONEY TO BUY MORE.: NEVER TRUE

## 2024-04-09 ASSESSMENT — ENCOUNTER SYMPTOMS
LOSS OF SENSATION IN FEET: 0
COUGH: 0
OCCASIONAL FEELINGS OF UNSTEADINESS: 0
SPEECH DIFFICULTY: 0
WHEEZING: 0
NECK STIFFNESS: 0
SEIZURES: 0
ARTHRALGIAS: 1
FEVER: 0
DIZZINESS: 0
CHILLS: 0
BACK PAIN: 1
BLOOD IN STOOL: 0
VOMITING: 0
CONSTIPATION: 0
DEPRESSION: 0
CHEST TIGHTNESS: 0
DIFFICULTY URINATING: 0
WEAKNESS: 0
NAUSEA: 0
DIARRHEA: 0
NUMBNESS: 0
DIAPHORESIS: 0
SHORTNESS OF BREATH: 0
NECK PAIN: 0
EYE DISCHARGE: 0

## 2024-04-09 ASSESSMENT — PAIN SCALES - GENERAL: PAINLEVEL_OUTOF10: 3

## 2024-04-09 ASSESSMENT — PAIN - FUNCTIONAL ASSESSMENT: PAIN_FUNCTIONAL_ASSESSMENT: 0-10

## 2024-04-09 NOTE — OP NOTE
4/9/2024    Pre procedure Diagnosis: Lumbar neuritis  Post procedure Diagnosis: Lumbar neuritis    Procedure:     1. RIGHT L5 AND S1 TRANSFORAMINAL epidural steroid injection   2. Fluoroscopic guidance     Complications: None    Assistants: None     EBL: None       The patient was identified in the preoperative area. After risks and benefits were explained, informed consent was obtained. The patient was brought back to the operating room and placed in the prone position. Standard ASA monitors were applied and monitored throughout the procedure. The vital signs were stable throughout. The patient's lumbosacral spine was prepped and draped in usual sterile fashion. Using fluoroscopic guidance the skin overlying the trajectory to the RIGHT L5 AND S1 transforaminal space was anesthetized with a total of 10.0 ml of 0.5% Lidocaine. Thereafter, two 3.5 in long 22G spinal needle was advanced through the anesthitized skin. Then, the needle was advanced into the RIGHT L5 AND S1 epidural space under multiplanar fluoroscopic guidance. Next a total of 2 ml of OMNIPAQUE 240 radiocontrast dye showed appropriate epidural spread and no vascular uptake. After negative aspiration for heme, or CSF a total of 4 mL of 0.5% Lidocaine and 40 mg of Kenalog was injected in equally divided doses at both sites. The needles were removed and a sterile dressings were applied. The patient tolerated the procedure well and was transported to PACU in good condition.    PLAN: The pt will follow up in the office in one to two months to report their results with the procedure. Discharge instructions were reviewed in recovery and provided to the patient in writing. They were advised to call should they have any questions or concerns.

## 2024-04-09 NOTE — H&P
History Of Present Illness  Ilsa Bowman is a 48 y.o. female presenting with chronic right sided low back pain.     Past Medical History  No past medical history on file.    Surgical History  Past Surgical History:   Procedure Laterality Date    ANKLE Left     fracture        Social History  She reports that she quit smoking about 4 months ago. Her smoking use included cigarettes. She uses smokeless tobacco. She reports that she does not drink alcohol and does not use drugs.    Family History  No family history on file.     Allergies  Patient has no known allergies.    Review of Systems   Constitutional:  Negative for chills, diaphoresis and fever.   HENT:  Negative for ear discharge and tinnitus.    Eyes:  Negative for discharge.   Respiratory:  Negative for cough, chest tightness, shortness of breath and wheezing.    Cardiovascular:  Negative for chest pain.   Gastrointestinal:  Negative for blood in stool, constipation, diarrhea, nausea and vomiting.   Endocrine: Negative for polyuria.   Genitourinary:  Negative for difficulty urinating.   Musculoskeletal:  Positive for arthralgias, back pain and gait problem. Negative for neck pain and neck stiffness.   Skin:  Negative for rash.   Neurological:  Negative for dizziness, seizures, speech difficulty, weakness and numbness.        Physical Exam  Constitutional:       Appearance: Normal appearance.   HENT:      Head: Normocephalic.      Nose: Nose normal.      Mouth/Throat:      Mouth: Mucous membranes are moist.      Pharynx: Oropharynx is clear.   Eyes:      Extraocular Movements: Extraocular movements intact.      Conjunctiva/sclera: Conjunctivae normal.      Pupils: Pupils are equal, round, and reactive to light.   Cardiovascular:      Rate and Rhythm: Normal rate.   Pulmonary:      Effort: Pulmonary effort is normal. No respiratory distress.      Breath sounds: No wheezing.   Chest:      Chest wall: No tenderness.   Abdominal:      Palpations: Abdomen is  soft.   Musculoskeletal:      Cervical back: No rigidity.   Skin:     General: Skin is warm and dry.      Findings: No rash.   Neurological:      Mental Status: She is alert and oriented to person, place, and time.      Sensory: Sensory deficit present.      Motor: Weakness present.      Gait: Gait abnormal.   Psychiatric:         Mood and Affect: Mood normal.         Behavior: Behavior normal.          Last Recorded Vitals  There were no vitals taken for this visit.       Assessment/Plan   1. Lumbar neuritis  Transforaminal    Transforaminal           Kofi Mccallum MD

## 2024-04-30 DIAGNOSIS — M54.50 LOW BACK PAIN, UNSPECIFIED BACK PAIN LATERALITY, UNSPECIFIED CHRONICITY, UNSPECIFIED WHETHER SCIATICA PRESENT: ICD-10-CM

## 2024-04-30 DIAGNOSIS — M54.16 LUMBAR NEURITIS: ICD-10-CM

## 2024-04-30 DIAGNOSIS — M51.26 LUMBAR DISC HERNIATION: ICD-10-CM

## 2024-04-30 DIAGNOSIS — M48.061 LUMBAR FORAMINAL STENOSIS: ICD-10-CM

## 2024-04-30 RX ORDER — GABAPENTIN 800 MG/1
TABLET ORAL
Qty: 120 TABLET | Refills: 11 | Status: SHIPPED | OUTPATIENT
Start: 2024-04-30

## 2024-06-17 ENCOUNTER — TELEPHONE (OUTPATIENT)
Dept: PAIN MEDICINE | Facility: CLINIC | Age: 49
End: 2024-06-17
Payer: COMMERCIAL

## 2024-06-17 DIAGNOSIS — M54.16 LUMBAR NEURITIS: Primary | ICD-10-CM

## 2024-06-17 NOTE — TELEPHONE ENCOUNTER
Patient called back wanting to let you know that she is having pain in both legs and is asking if she could have another injection?  Her last injection was 4/9/24 and is scheduled for a FUV 7/10 but she doesn't think she can wait that long because of the pain.  Please advise if she can be scheduled for another lumbar transforaminal.  Thank you.

## 2024-06-17 NOTE — TELEPHONE ENCOUNTER
Patient has been scheduled for the procedure on July 11.     She is wanting to let you know that she is having pain going down both legs now and is asking for advice regarding what you recommend for the pain.   Thank you

## 2024-06-18 NOTE — TELEPHONE ENCOUNTER
Changed patients procedure to Bilateral Lumbar Transforaminal, L5.  I called patient to let her know and I advised the prior authorization team of this change also.

## 2024-07-10 ENCOUNTER — APPOINTMENT (OUTPATIENT)
Dept: PAIN MEDICINE | Facility: CLINIC | Age: 49
End: 2024-07-10
Payer: COMMERCIAL

## 2024-07-11 ENCOUNTER — HOSPITAL ENCOUNTER (OUTPATIENT)
Dept: PAIN MEDICINE | Facility: CLINIC | Age: 49
Discharge: HOME | End: 2024-07-11
Payer: COMMERCIAL

## 2024-07-11 ENCOUNTER — APPOINTMENT (OUTPATIENT)
Dept: PAIN MEDICINE | Facility: CLINIC | Age: 49
End: 2024-07-11
Payer: COMMERCIAL

## 2024-07-11 VITALS
SYSTOLIC BLOOD PRESSURE: 123 MMHG | TEMPERATURE: 98.1 F | OXYGEN SATURATION: 98 % | HEART RATE: 103 BPM | RESPIRATION RATE: 18 BRPM | DIASTOLIC BLOOD PRESSURE: 80 MMHG

## 2024-07-11 DIAGNOSIS — M54.16 LUMBAR NEURITIS: ICD-10-CM

## 2024-07-11 DIAGNOSIS — M54.30 SCIATICA ASSOCIATED WITH DISORDER OF MULTIPLE SITES OF SPINE: ICD-10-CM

## 2024-07-11 DIAGNOSIS — M53.9 SCIATICA ASSOCIATED WITH DISORDER OF MULTIPLE SITES OF SPINE: ICD-10-CM

## 2024-07-11 LAB — PREGNANCY TEST URINE, POC: NEGATIVE

## 2024-07-11 PROCEDURE — 2500000005 HC RX 250 GENERAL PHARMACY W/O HCPCS: Performed by: ANESTHESIOLOGY

## 2024-07-11 PROCEDURE — 2500000004 HC RX 250 GENERAL PHARMACY W/ HCPCS (ALT 636 FOR OP/ED): Performed by: ANESTHESIOLOGY

## 2024-07-11 PROCEDURE — 2550000001 HC RX 255 CONTRASTS: Performed by: ANESTHESIOLOGY

## 2024-07-11 PROCEDURE — 64483 NJX AA&/STRD TFRM EPI L/S 1: CPT | Performed by: ANESTHESIOLOGY

## 2024-07-11 RX ORDER — SODIUM CHLORIDE 9 MG/ML
INJECTION, SOLUTION INTRAMUSCULAR; INTRAVENOUS; SUBCUTANEOUS AS NEEDED
Status: COMPLETED | OUTPATIENT
Start: 2024-07-11 | End: 2024-07-11

## 2024-07-11 RX ORDER — LIDOCAINE HYDROCHLORIDE 5 MG/ML
INJECTION, SOLUTION INFILTRATION; INTRAVENOUS AS NEEDED
Status: COMPLETED | OUTPATIENT
Start: 2024-07-11 | End: 2024-07-11

## 2024-07-11 RX ORDER — TRIAMCINOLONE ACETONIDE 40 MG/ML
INJECTION, SUSPENSION INTRA-ARTICULAR; INTRAMUSCULAR AS NEEDED
Status: COMPLETED | OUTPATIENT
Start: 2024-07-11 | End: 2024-07-11

## 2024-07-11 ASSESSMENT — ENCOUNTER SYMPTOMS
CHILLS: 0
SPEECH DIFFICULTY: 0
SHORTNESS OF BREATH: 0
BLOOD IN STOOL: 0
DIZZINESS: 0
CHEST TIGHTNESS: 0
WEAKNESS: 0
EYE DISCHARGE: 0
COUGH: 0
DIAPHORESIS: 0
ARTHRALGIAS: 1
FEVER: 0
DIFFICULTY URINATING: 0
NECK STIFFNESS: 0
NECK PAIN: 0
NUMBNESS: 1
WHEEZING: 0
NAUSEA: 0
CONSTIPATION: 0
DIARRHEA: 0
SEIZURES: 0
VOMITING: 0
BACK PAIN: 1

## 2024-07-11 ASSESSMENT — PAIN SCALES - GENERAL
PAINLEVEL_OUTOF10: 6
PAINLEVEL_OUTOF10: 5 - MODERATE PAIN

## 2024-07-11 ASSESSMENT — PAIN - FUNCTIONAL ASSESSMENT
PAIN_FUNCTIONAL_ASSESSMENT: 0-10
PAIN_FUNCTIONAL_ASSESSMENT: 0-10

## 2024-07-11 ASSESSMENT — PAIN DESCRIPTION - DESCRIPTORS: DESCRIPTORS: ACHING

## 2024-07-11 NOTE — H&P
History Of Present Illness  Ilsa Bowman is a 48 y.o. female presenting with chronic low back pain.     Past Medical History  No past medical history on file.    Surgical History  Past Surgical History:   Procedure Laterality Date    ANKLE Left     fracture        Social History  She reports that she quit smoking about 7 months ago. Her smoking use included cigarettes. She uses smokeless tobacco. She reports that she does not drink alcohol and does not use drugs.    Family History  No family history on file.     Allergies  Patient has no known allergies.    Review of Systems   Constitutional:  Negative for chills, diaphoresis and fever.   HENT:  Negative for ear discharge and tinnitus.    Eyes:  Negative for discharge.   Respiratory:  Negative for cough, chest tightness, shortness of breath and wheezing.    Cardiovascular:  Negative for chest pain.   Gastrointestinal:  Negative for blood in stool, constipation, diarrhea, nausea and vomiting.   Endocrine: Negative for polyuria.   Genitourinary:  Negative for difficulty urinating.   Musculoskeletal:  Positive for arthralgias, back pain and gait problem. Negative for neck pain and neck stiffness.   Skin:  Negative for rash.   Neurological:  Positive for numbness. Negative for dizziness, seizures, speech difficulty and weakness.        Physical Exam  Constitutional:       Appearance: Normal appearance.   HENT:      Head: Normocephalic.      Nose: Nose normal.      Mouth/Throat:      Mouth: Mucous membranes are moist.      Pharynx: Oropharynx is clear.   Eyes:      Extraocular Movements: Extraocular movements intact.      Conjunctiva/sclera: Conjunctivae normal.      Pupils: Pupils are equal, round, and reactive to light.   Cardiovascular:      Rate and Rhythm: Normal rate.   Pulmonary:      Effort: Pulmonary effort is normal. No respiratory distress.      Breath sounds: No wheezing.   Chest:      Chest wall: No tenderness.   Abdominal:      Palpations: Abdomen is  soft.   Musculoskeletal:      Cervical back: No rigidity.   Skin:     General: Skin is warm and dry.      Findings: No rash.   Neurological:      Mental Status: She is alert and oriented to person, place, and time.      Sensory: Sensory deficit present.      Motor: Weakness present.      Gait: Gait abnormal.   Psychiatric:         Mood and Affect: Mood normal.         Behavior: Behavior normal.          Last Recorded Vitals  Blood pressure 123/80, pulse 103, temperature 36.7 °C (98.1 °F), temperature source Tympanic, resp. rate 18, SpO2 98%.       Assessment/Plan   1. Lumbar neuritis  Transforaminal    Transforaminal    FL pain management    FL pain management      2. 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

## 2024-08-05 ENCOUNTER — TELEPHONE (OUTPATIENT)
Dept: PAIN MEDICINE | Facility: CLINIC | Age: 49
End: 2024-08-05
Payer: COMMERCIAL

## 2024-08-05 DIAGNOSIS — M54.16 LUMBAR NEURITIS: Primary | ICD-10-CM

## 2024-08-12 ENCOUNTER — OFFICE VISIT (OUTPATIENT)
Dept: PAIN MEDICINE | Facility: CLINIC | Age: 49
End: 2024-08-12
Payer: COMMERCIAL

## 2024-08-12 VITALS
SYSTOLIC BLOOD PRESSURE: 120 MMHG | BODY MASS INDEX: 25.35 KG/M2 | HEART RATE: 94 BPM | OXYGEN SATURATION: 97 % | WEIGHT: 150 LBS | RESPIRATION RATE: 15 BRPM | DIASTOLIC BLOOD PRESSURE: 70 MMHG

## 2024-08-12 DIAGNOSIS — M54.16 LUMBAR NEURITIS: ICD-10-CM

## 2024-08-12 DIAGNOSIS — M54.50 LOW BACK PAIN, UNSPECIFIED BACK PAIN LATERALITY, UNSPECIFIED CHRONICITY, UNSPECIFIED WHETHER SCIATICA PRESENT: ICD-10-CM

## 2024-08-12 DIAGNOSIS — M48.062 SPINAL STENOSIS OF LUMBAR REGION WITH NEUROGENIC CLAUDICATION: ICD-10-CM

## 2024-08-12 DIAGNOSIS — M48.061 LUMBAR FORAMINAL STENOSIS: ICD-10-CM

## 2024-08-12 DIAGNOSIS — M51.26 LUMBAR DISC HERNIATION: Primary | ICD-10-CM

## 2024-08-12 PROCEDURE — 99214 OFFICE O/P EST MOD 30 MIN: CPT | Performed by: ANESTHESIOLOGY

## 2024-08-12 RX ORDER — DULOXETIN HYDROCHLORIDE 30 MG/1
30 CAPSULE, DELAYED RELEASE ORAL 2 TIMES DAILY
Qty: 180 CAPSULE | Refills: 2 | Status: SHIPPED | OUTPATIENT
Start: 2024-08-12 | End: 2024-11-10

## 2024-08-12 RX ORDER — GABAPENTIN 800 MG/1
800 TABLET ORAL 4 TIMES DAILY
Qty: 360 TABLET | Refills: 3 | Status: SHIPPED | OUTPATIENT
Start: 2024-08-12 | End: 2024-11-10

## 2024-08-12 ASSESSMENT — PAIN - FUNCTIONAL ASSESSMENT: PAIN_FUNCTIONAL_ASSESSMENT: 0-10

## 2024-08-12 ASSESSMENT — ENCOUNTER SYMPTOMS
OCCASIONAL FEELINGS OF UNSTEADINESS: 1
LOSS OF SENSATION IN FEET: 1

## 2024-08-12 ASSESSMENT — PAIN DESCRIPTION - DESCRIPTORS: DESCRIPTORS: SHARP

## 2024-08-12 ASSESSMENT — PAIN SCALES - GENERAL
PAINLEVEL: 7
PAINLEVEL_OUTOF10: 7

## 2024-08-12 NOTE — PROGRESS NOTES
Low back pain mostly right side and down the leg, hips and buttox.  Sometimes the left side.  Denies back and neck surgery

## 2024-08-12 NOTE — PROGRESS NOTES
History Of Present Illness  Ilsa Bowman is a 48 y.o. female presenting with   Chief Complaint   Patient presents with    Follow-up       Patient returns for flare up of her  chronic low back pain to the RLE and now LLE thru her tailbone and down her RLE with numbness in her first 2 great toes which started in November of 2023. She was hospitalized for several days at [facility] for severe pain and was unable to walk.  She was treated for pain and discharged .       The pain is constant, worse with standing and better with sitting. The pain is a locking and stabbing and shooting to the bilateral LE worse on the RIGHT side. Denies LE saddle anesthesia, bowel or bladder incontinence. To manage this pain the patient has attempted Oxycodone via hospital discharge.     PAIN SCORE:  2-7 /10 at her last visit her pain was a 3-10    PCP: Dr. Gerardo Morrow MD ([facility])        Past Medical History  She has no past medical history on file.    Surgical History  She has a past surgical history that includes XR ankle (Left).     Social History  She reports that she quit smoking about 8 months ago. Her smoking use included cigarettes. She uses smokeless tobacco. She reports that she does not drink alcohol and does not use drugs.    Works as a  and does a lot of lifting     Family History  No family history on file.     Allergies  Patient has no known allergies.    Review of Systems    All other systems reviewed and negative for any deficits. Pertinent positives and negatives were considered in the medical decision making process.        Physical Exam  /70   Pulse 94   Resp 15   Wt 68 kg (150 lb)   SpO2 97%     General: Pt appears stated age    Eyes: Conjunctiva non-icteric and lids without obvious rash or drooping. Pupils are symmetric    ENT: External ears and nose appear to be without deformity or rash. No lesions or masses noted. Hearing is grossly intact    Neck: No JVD noted, tracheal  position midline. No goiter noted on assessment of thyroid    Respiratory: No gasping or shortness of breath noted, no use of accessory muscles noted    Cardiovascular: Extremities show no edema or varicosities    Musculoskeletal: Gait is antalgic, unable to stand without assistance     Digits/nails show no clubbing or cyanosis    Exam of muscles/joints/bones shows no gross atrophy and no abnormal/involuntary movements in the head/neckNo asymmetry or masses noted in the head/neck    Stability: no subluxation noted on movement of bilateral upper extremities or head/neck    Strength: 4+/5 in RLE and 5/5 LLE     Range of Motion: DEC DORSIFLEXION IN RLE     Sensation: dec to sharp touch in rle into the shin     Cranial nerves 2-12 are grossly intact    Psychiatric: Pt is alert and oriented to time, place and person.         Assessment/Plan   1. Lumbar disc herniation  gabapentin (Neurontin) 800 mg tablet      2. Spinal stenosis of lumbar region with neurogenic claudication        3. Lumbar neuritis  DULoxetine (Cymbalta) 30 mg DR capsule    gabapentin (Neurontin) 800 mg tablet    Transforaminal    FL pain management      4. Lumbar foraminal stenosis  gabapentin (Neurontin) 800 mg tablet      5. Low back pain, unspecified back pain laterality, unspecified chronicity, unspecified whether sciatica present  gabapentin (Neurontin) 800 mg tablet             1. I have provided the patient with a list of physical therapy exercises to learn and perform to strengthen core, maintain stabilization, and reduce pain. We reviewed the exercises in detail and I encouraged them to perform them on a regular basis.    Pt is looking into getting home therapy via her PCP.     2. I would recommend the pt start on Gabapentin 800mg TID to help with nerve related pain. We discussed the risks, benefits, and side effects to this medication including the mechanism of action and the pt understands and agrees.    She was taking Gabapentin 600mg TID  with no side effects there is mild to moderate central canal stenosis and severe right lateral recess stenosis.     3. I extensively reviewed the patients MRI findings (11-) in detail, including review of the actual images and provided a detailed explanation of the findings using a spine model.     There is noted a [id number] disc herniation at the L4/5 level severe stenosis. There ar smaller disc herniations at the L3/4 and L5/S1 level.     4.  I previously advised the patient to quit tobacco as it worsens chronic pain. She reports she quit tobacco.     She reports she is down to 1 cig / day now and is working on quitting.     5.  The patient is a candidate for a BILATERAL LTR versus RIGHT LTR L5 AND S1 LTR to treat low back and radicular pain. I spent time with the patient discussing all of the risks, benefits, and alternatives to this measure. Including but not limited to spinal infection, epidural hematoma/abscess, paralysis, nerve injury, steroid effects, and spinal headache. The patient understands and agrees to proceed as needed. Pt understands that her injection may make her pain significantly worse given the degree of her stenosis.     Her last injection was on 7-, 12- and she reported 80% relief of her pain that lasted for several weeks. She has been able to stand and walk with less pain.     Kofi Mccallum MD    I spent time with the patient reviewing their imaging and discussing the risks benefits and alternatives to the above plan. A total of 30 minutes was spent reviewing the data and greater than 50% of that time was with the patient during the face to face encounter discussing treatment options both surgical, non-surgical, and minimally invasive techniques.

## 2024-09-10 ENCOUNTER — APPOINTMENT (OUTPATIENT)
Dept: PAIN MEDICINE | Facility: CLINIC | Age: 49
End: 2024-09-10
Payer: COMMERCIAL

## 2024-09-26 ENCOUNTER — HOSPITAL ENCOUNTER (OUTPATIENT)
Dept: PAIN MEDICINE | Facility: CLINIC | Age: 49
Discharge: HOME | End: 2024-09-26
Payer: COMMERCIAL

## 2024-09-26 VITALS
TEMPERATURE: 99 F | BODY MASS INDEX: 24.99 KG/M2 | OXYGEN SATURATION: 97 % | WEIGHT: 150 LBS | RESPIRATION RATE: 15 BRPM | DIASTOLIC BLOOD PRESSURE: 59 MMHG | SYSTOLIC BLOOD PRESSURE: 124 MMHG | HEIGHT: 65 IN | HEART RATE: 87 BPM

## 2024-09-26 DIAGNOSIS — M51.26 LUMBAR DISC HERNIATION: ICD-10-CM

## 2024-09-26 DIAGNOSIS — M54.16 LUMBAR NEURITIS: ICD-10-CM

## 2024-09-26 LAB — PREGNANCY TEST URINE, POC: NEGATIVE

## 2024-09-26 PROCEDURE — 81025 URINE PREGNANCY TEST: CPT | Performed by: ANESTHESIOLOGY

## 2024-09-26 PROCEDURE — 2500000005 HC RX 250 GENERAL PHARMACY W/O HCPCS: Performed by: ANESTHESIOLOGY

## 2024-09-26 PROCEDURE — 64483 NJX AA&/STRD TFRM EPI L/S 1: CPT | Performed by: ANESTHESIOLOGY

## 2024-09-26 PROCEDURE — 2500000004 HC RX 250 GENERAL PHARMACY W/ HCPCS (ALT 636 FOR OP/ED): Performed by: ANESTHESIOLOGY

## 2024-09-26 RX ORDER — TRIAMCINOLONE ACETONIDE 40 MG/ML
INJECTION, SUSPENSION INTRA-ARTICULAR; INTRAMUSCULAR AS NEEDED
Status: COMPLETED | OUTPATIENT
Start: 2024-09-26 | End: 2024-09-26

## 2024-09-26 RX ORDER — LIDOCAINE HYDROCHLORIDE 5 MG/ML
INJECTION, SOLUTION INFILTRATION; INTRAVENOUS AS NEEDED
Status: COMPLETED | OUTPATIENT
Start: 2024-09-26 | End: 2024-09-26

## 2024-09-26 RX ORDER — IBUPROFEN 200 MG
200 TABLET ORAL
COMMUNITY

## 2024-09-26 RX ORDER — SODIUM CHLORIDE 9 MG/ML
INJECTION, SOLUTION INTRAMUSCULAR; INTRAVENOUS; SUBCUTANEOUS AS NEEDED
Status: COMPLETED | OUTPATIENT
Start: 2024-09-26 | End: 2024-09-26

## 2024-09-26 ASSESSMENT — ENCOUNTER SYMPTOMS
WEAKNESS: 0
DIFFICULTY URINATING: 0
WHEEZING: 0
BLOOD IN STOOL: 0
LOSS OF SENSATION IN FEET: 1
CONSTIPATION: 0
CHILLS: 0
EYE DISCHARGE: 0
SPEECH DIFFICULTY: 0
OCCASIONAL FEELINGS OF UNSTEADINESS: 0
CHEST TIGHTNESS: 0
BACK PAIN: 1
VOMITING: 0
DEPRESSION: 0
DIARRHEA: 0
DIAPHORESIS: 0
ARTHRALGIAS: 1
SHORTNESS OF BREATH: 0
NECK STIFFNESS: 0
DIZZINESS: 0
NUMBNESS: 1
SEIZURES: 0
COUGH: 0
NAUSEA: 0
FEVER: 0
NECK PAIN: 0

## 2024-09-26 ASSESSMENT — PAIN SCALES - GENERAL
PAINLEVEL_OUTOF10: 6
PAINLEVEL_OUTOF10: 7

## 2024-09-26 ASSESSMENT — PAIN - FUNCTIONAL ASSESSMENT: PAIN_FUNCTIONAL_ASSESSMENT: 0-10

## 2024-09-26 NOTE — H&P
History Of Present Illness  Sid Bowman is a 49 y.o. female presenting with lumbar neuritis.     Past Medical History  History reviewed. No pertinent past medical history.    Surgical History  Past Surgical History:   Procedure Laterality Date    ANKLE Left     fracture        Social History  She reports that she quit smoking about 9 months ago. Her smoking use included cigarettes. She uses smokeless tobacco. She reports that she does not drink alcohol and does not use drugs.    Family History  No family history on file.     Allergies  Patient has no known allergies.    Review of Systems   Constitutional:  Negative for chills, diaphoresis and fever.   HENT:  Negative for ear discharge and tinnitus.    Eyes:  Negative for discharge.   Respiratory:  Negative for cough, chest tightness, shortness of breath and wheezing.    Cardiovascular:  Negative for chest pain.   Gastrointestinal:  Negative for blood in stool, constipation, diarrhea, nausea and vomiting.   Endocrine: Negative for polyuria.   Genitourinary:  Negative for difficulty urinating.   Musculoskeletal:  Positive for arthralgias, back pain and gait problem. Negative for neck pain and neck stiffness.   Skin:  Negative for rash.   Neurological:  Positive for numbness. Negative for dizziness, seizures, speech difficulty and weakness.        Physical Exam  Constitutional:       Appearance: Normal appearance.   HENT:      Head: Normocephalic.      Nose: Nose normal.      Mouth/Throat:      Mouth: Mucous membranes are moist.      Pharynx: Oropharynx is clear.   Eyes:      Extraocular Movements: Extraocular movements intact.      Conjunctiva/sclera: Conjunctivae normal.      Pupils: Pupils are equal, round, and reactive to light.   Cardiovascular:      Rate and Rhythm: Normal rate.   Pulmonary:      Effort: Pulmonary effort is normal. No respiratory distress.      Breath sounds: No wheezing.   Chest:      Chest wall: No tenderness.   Abdominal:       "Palpations: Abdomen is soft.   Musculoskeletal:      Cervical back: No rigidity.   Skin:     General: Skin is warm and dry.      Findings: No rash.   Neurological:      Mental Status: She is alert and oriented to person, place, and time.      Sensory: Sensory deficit present.      Motor: Weakness present.      Gait: Gait abnormal.   Psychiatric:         Mood and Affect: Mood normal.         Behavior: Behavior normal.          Last Recorded Vitals  Blood pressure 124/59, pulse 92, temperature 37.2 °C (99 °F), temperature source Tympanic, resp. rate 18, height 1.638 m (5' 4.5\"), weight 68 kg (150 lb), SpO2 98%.       Assessment/Plan   1. Lumbar neuritis  Transforaminal    Transforaminal    FL pain management    FL pain management      2. Lumbar disc herniation  POCT pregnancy, urine manually resulted           Kofi Mccallum MD    "

## 2024-10-09 ENCOUNTER — APPOINTMENT (OUTPATIENT)
Dept: PAIN MEDICINE | Facility: CLINIC | Age: 49
End: 2024-10-09
Payer: COMMERCIAL

## 2024-12-16 ENCOUNTER — OFFICE VISIT (OUTPATIENT)
Dept: PAIN MEDICINE | Facility: CLINIC | Age: 49
End: 2024-12-16
Payer: COMMERCIAL

## 2024-12-16 VITALS
HEIGHT: 65 IN | SYSTOLIC BLOOD PRESSURE: 118 MMHG | BODY MASS INDEX: 24.99 KG/M2 | RESPIRATION RATE: 15 BRPM | HEART RATE: 91 BPM | WEIGHT: 150 LBS | OXYGEN SATURATION: 97 % | TEMPERATURE: 97.5 F | DIASTOLIC BLOOD PRESSURE: 71 MMHG

## 2024-12-16 DIAGNOSIS — M48.062 SPINAL STENOSIS OF LUMBAR REGION WITH NEUROGENIC CLAUDICATION: ICD-10-CM

## 2024-12-16 DIAGNOSIS — M54.16 LUMBAR NEURITIS: Primary | ICD-10-CM

## 2024-12-16 DIAGNOSIS — M51.26 LUMBAR DISC HERNIATION: ICD-10-CM

## 2024-12-16 PROCEDURE — 99214 OFFICE O/P EST MOD 30 MIN: CPT | Performed by: ANESTHESIOLOGY

## 2024-12-16 ASSESSMENT — PAIN - FUNCTIONAL ASSESSMENT: PAIN_FUNCTIONAL_ASSESSMENT: 0-10

## 2024-12-16 ASSESSMENT — ENCOUNTER SYMPTOMS
OCCASIONAL FEELINGS OF UNSTEADINESS: 0
LOSS OF SENSATION IN FEET: 0

## 2024-12-16 ASSESSMENT — PAIN SCALES - GENERAL: PAINLEVEL_OUTOF10: 6

## 2024-12-16 ASSESSMENT — PATIENT HEALTH QUESTIONNAIRE - PHQ9
SUM OF ALL RESPONSES TO PHQ9 QUESTIONS 1 AND 2: 2
2. FEELING DOWN, DEPRESSED OR HOPELESS: SEVERAL DAYS
1. LITTLE INTEREST OR PLEASURE IN DOING THINGS: SEVERAL DAYS

## 2024-12-16 ASSESSMENT — PAIN DESCRIPTION - DESCRIPTORS: DESCRIPTORS: NUMBNESS;SHARP

## 2024-12-16 NOTE — PROGRESS NOTES
History Of Present Illness  Sid Bowman is a 49 y.o. female presenting with   Chief Complaint   Patient presents with    Follow-up       Patient returns for flare up of her  chronic low back pain to the RLE and now LLE thru her tailbone and down her RLE with numbness in her first 2 great toes which started in November of 2023. She was hospitalized for several days at [facility] for severe pain and was unable to walk.  She was treated for pain and discharged .       The pain is constant, worse with standing and better with sitting. The pain is a locking and stabbing and shooting to the bilateral LE worse on the RIGHT side. Denies LE saddle anesthesia, bowel or bladder incontinence. To manage this pain the patient has attempted Oxycodone via hospital discharge.     Pt was admitted at [facility] in November of 2024 due to severe flare up of her low back pain.     PAIN SCORE:  6 /10 at her last visit her pain was a 3-10    PCP: Dr. Gerardo Morrow MD ([facility])        Past Medical History  She has no past medical history on file.    Surgical History  She has a past surgical history that includes XR ankle (Left).     Social History  She reports that she quit smoking about 12 months ago. Her smoking use included cigarettes. She uses smokeless tobacco. She reports that she does not drink alcohol and does not use drugs.    Works as a  and does a lot of lifting     Family History  No family history on file.     Allergies  Patient has no known allergies.    Review of Systems    All other systems reviewed and negative for any deficits. Pertinent positives and negatives were considered in the medical decision making process.        Physical Exam  /71   Pulse 91   Temp 36.4 °C (97.5 °F)   Resp 15   Wt 68 kg (150 lb)   SpO2 97%     General: Pt appears stated age    Eyes: Conjunctiva non-icteric and lids without obvious rash or drooping. Pupils are symmetric    ENT: External ears and nose appear to  be without deformity or rash. No lesions or masses noted. Hearing is grossly intact    Neck: No JVD noted, tracheal position midline. No goiter noted on assessment of thyroid    Respiratory: No gasping or shortness of breath noted, no use of accessory muscles noted    Cardiovascular: Extremities show no edema or varicosities    Musculoskeletal: Gait is antalgic, unable to stand without assistance     Digits/nails show no clubbing or cyanosis    Exam of muscles/joints/bones shows no gross atrophy and no abnormal/involuntary movements in the head/neckNo asymmetry or masses noted in the head/neck    Stability: no subluxation noted on movement of bilateral upper extremities or head/neck    Strength: 4+/5 in RLE and 5/5 LLE     Range of Motion: DEC DORSIFLEXION IN RLE     Sensation: dec to sharp touch in rle into the shin     Cranial nerves 2-12 are grossly intact    Psychiatric: Pt is alert and oriented to time, place and person.         Assessment/Plan   1. Lumbar neuritis        2. Spinal stenosis of lumbar region with neurogenic claudication        3. Lumbar disc herniation             1. I have provided the patient with a list of physical therapy exercises to learn and perform to strengthen core, maintain stabilization, and reduce pain. We reviewed the exercises in detail and I encouraged them to perform them on a regular basis.    Pt is looking into getting home therapy via her PCP.     2. I would recommend the pt start on Gabapentin 800mg TID to help with nerve related pain. We discussed the risks, benefits, and side effects to this medication including the mechanism of action and the pt understands and agrees.    She was taking Gabapentin 600mg TID with no side effects there is mild to moderate central canal stenosis and severe right lateral recess stenosis.     3. I extensively reviewed the patients MRI findings (11-) in detail, including review of the actual images and provided a detailed explanation of  the findings using a spine model.     There is noted a [id number] disc herniation at the L4/5 level severe stenosis. There ar smaller disc herniations at the L3/4 and L5/S1 level.     I extensively reviewed the patients MRI findings (2024) in detail, including review of the actual images and provided a detailed explanation of the findings using a spine model.     There is noted bilateral foraminal stenosis at the L5/S1 level.       4.  I previously advised the patient to quit tobacco as it worsens chronic pain. She reports she quit tobacco.     She reports she is down to 1 cig / day now and is working on quitting.     5.  The patient is a candidate for a BILATERAL LTR versus RIGHT LTR L5 AND S1 LTR to treat low back and radicular pain. I spent time with the patient discussing all of the risks, benefits, and alternatives to this measure. Including but not limited to spinal infection, epidural hematoma/abscess, paralysis, nerve injury, steroid effects, and spinal headache. The patient understands and agrees to proceed as needed. Pt understands that her injection may make her pain significantly worse given the degree of her stenosis.     Her last injection was on 7-, 12- and she reported 80% relief of her pain that lasted for several weeks. She has been able to stand and walk with less pain.     Kofi Mccallum MD    I spent time with the patient reviewing their imaging and discussing the risks benefits and alternatives to the above plan. A total of 30 minutes was spent reviewing the data and greater than 50% of that time was with the patient during the face to face encounter discussing treatment options both surgical, non-surgical, and minimally invasive techniques.

## 2025-01-21 ENCOUNTER — APPOINTMENT (OUTPATIENT)
Dept: PAIN MEDICINE | Facility: CLINIC | Age: 50
End: 2025-01-21
Payer: COMMERCIAL

## 2025-02-11 ENCOUNTER — APPOINTMENT (OUTPATIENT)
Dept: PAIN MEDICINE | Facility: CLINIC | Age: 50
End: 2025-02-11
Payer: COMMERCIAL

## 2025-04-01 ENCOUNTER — APPOINTMENT (OUTPATIENT)
Dept: PAIN MEDICINE | Facility: CLINIC | Age: 50
End: 2025-04-01
Payer: COMMERCIAL

## 2025-08-25 DIAGNOSIS — M48.061 LUMBAR FORAMINAL STENOSIS: ICD-10-CM

## 2025-08-25 DIAGNOSIS — M51.26 LUMBAR DISC HERNIATION: ICD-10-CM

## 2025-08-25 DIAGNOSIS — M54.16 LUMBAR NEURITIS: ICD-10-CM

## 2025-08-25 DIAGNOSIS — M54.50 LOW BACK PAIN, UNSPECIFIED BACK PAIN LATERALITY, UNSPECIFIED CHRONICITY, UNSPECIFIED WHETHER SCIATICA PRESENT: ICD-10-CM

## 2025-08-26 RX ORDER — GABAPENTIN 800 MG/1
800 TABLET ORAL EVERY 6 HOURS
Qty: 120 TABLET | Refills: 0 | Status: SHIPPED | OUTPATIENT
Start: 2025-08-26

## 2025-09-15 ENCOUNTER — APPOINTMENT (OUTPATIENT)
Dept: PAIN MEDICINE | Facility: CLINIC | Age: 50
End: 2025-09-15
Payer: COMMERCIAL